# Patient Record
Sex: FEMALE | Race: WHITE | NOT HISPANIC OR LATINO | URBAN - METROPOLITAN AREA
[De-identification: names, ages, dates, MRNs, and addresses within clinical notes are randomized per-mention and may not be internally consistent; named-entity substitution may affect disease eponyms.]

---

## 2017-12-09 ENCOUNTER — EMERGENCY (EMERGENCY)
Facility: HOSPITAL | Age: 82
LOS: 1 days | Discharge: HOME | End: 2017-12-09
Attending: EMERGENCY MEDICINE
Payer: MEDICARE

## 2017-12-09 VITALS
OXYGEN SATURATION: 98 % | SYSTOLIC BLOOD PRESSURE: 145 MMHG | TEMPERATURE: 98 F | RESPIRATION RATE: 18 BRPM | HEART RATE: 69 BPM | DIASTOLIC BLOOD PRESSURE: 55 MMHG

## 2017-12-09 VITALS
HEART RATE: 70 BPM | OXYGEN SATURATION: 99 % | SYSTOLIC BLOOD PRESSURE: 147 MMHG | RESPIRATION RATE: 17 BRPM | DIASTOLIC BLOOD PRESSURE: 65 MMHG | TEMPERATURE: 98 F

## 2017-12-09 PROCEDURE — 99283 EMERGENCY DEPT VISIT LOW MDM: CPT | Mod: 25

## 2017-12-09 PROCEDURE — 99284 EMERGENCY DEPT VISIT MOD MDM: CPT | Mod: 25,GC

## 2017-12-09 PROCEDURE — 93005 ELECTROCARDIOGRAM TRACING: CPT

## 2017-12-09 PROCEDURE — 71010: CPT | Mod: 26

## 2017-12-09 PROCEDURE — 93010 ELECTROCARDIOGRAM REPORT: CPT

## 2017-12-09 PROCEDURE — 71045 X-RAY EXAM CHEST 1 VIEW: CPT

## 2017-12-09 RX ORDER — ONDANSETRON 8 MG/1
4 TABLET, FILM COATED ORAL ONCE
Qty: 0 | Refills: 0 | Status: COMPLETED | OUTPATIENT
Start: 2017-12-09 | End: 2017-12-09

## 2017-12-09 RX ORDER — FAMOTIDINE 10 MG/ML
20 INJECTION INTRAVENOUS ONCE
Qty: 0 | Refills: 0 | Status: DISCONTINUED | OUTPATIENT
Start: 2017-12-09 | End: 2017-12-09

## 2017-12-09 RX ORDER — FAMOTIDINE 10 MG/ML
20 INJECTION INTRAVENOUS ONCE
Qty: 0 | Refills: 0 | Status: COMPLETED | OUTPATIENT
Start: 2017-12-09 | End: 2017-12-09

## 2017-12-09 RX ORDER — SODIUM CHLORIDE 9 MG/ML
1000 INJECTION INTRAMUSCULAR; INTRAVENOUS; SUBCUTANEOUS ONCE
Qty: 0 | Refills: 0 | Status: COMPLETED | OUTPATIENT
Start: 2017-12-09 | End: 2017-12-09

## 2017-12-09 RX ORDER — GLUCAGON INJECTION, SOLUTION 0.5 MG/.1ML
1 INJECTION, SOLUTION SUBCUTANEOUS ONCE
Qty: 0 | Refills: 0 | Status: COMPLETED | OUTPATIENT
Start: 2017-12-09 | End: 2017-12-09

## 2017-12-09 RX ADMIN — Medication 30 MILLILITER(S): at 20:19

## 2017-12-09 RX ADMIN — FAMOTIDINE 20 MILLIGRAM(S): 10 INJECTION INTRAVENOUS at 20:19

## 2017-12-09 NOTE — ED ADULT NURSE REASSESSMENT NOTE - NS ED NURSE REASSESS COMMENT FT1
Pt given crackers and water. Able to tolerate well. Pt says she feels better and no symptoms like before.

## 2017-12-09 NOTE — ED PROVIDER NOTE - CONSTITUTIONAL, MLM
normal... Well appearing, well nourished, awake, alert, oriented to person, place, time/situation and in no apparent distress. Well appearing, well nourished, awake, alert, oriented to person, place, time/situation and in mild distress from discomfort

## 2017-12-09 NOTE — ED PROVIDER NOTE - OBJECTIVE STATEMENT
89 yo F with hx of pAF on coumadin, CHF and anemia requiring intermittent blood transfusions, now p/f home after "choking" on her dinner.  Pt states she was eating dinner when she felt like she chocked on a chestnut, developed epigastric discomfort, and vomited all contents. Pt's son who is a Radiologist, states they tried to give her sips of water to "push along the sensation of an obstruction" but she spit up the water as well.    Pt now has epigastric discomfort, no nausea. Last spit up was 30 min ago.  No other complaints. 89 yo F with hx of pAF on coumadin, CHF and anemia requiring intermittent blood transfusions, now p/f home after "choking" on her dinner. Pt states she was eating dinner when she felt like she chocked on a chestnut, developed epigastric discomfort, and vomited all contents. Pt's son who is a Radiologist, states they tried to give her sips of water to "push along the sensation of an obstruction" but she spit up the water as well.    Pt now has epigastric discomfort, no nausea. Last spit up was 30 min ago.  No other complaints.

## 2017-12-09 NOTE — ED PROVIDER NOTE - MEDICAL DECISION MAKING DETAILS
pepcid pt with likely food impaction, will attempt tp pass with water. if fails, glucagon, labs, imaging, GI cocktail.

## 2017-12-09 NOTE — ED ADULT NURSE REASSESSMENT NOTE - NS ED NURSE REASSESS COMMENT FT1
Pt says she feels like lump in throat has gone away. Pt nephew states he wanted to see is pt could tolerate PO before labs and IV meds are given. Pt given small cup of water. Pt tolerated well, no choking or gurgling noted. Pt says she does not feel how she felt before when she felt something lodged in her throat. MD Negrete aware. Was told to try oral maalox and pepcid.

## 2017-12-09 NOTE — ED PROVIDER NOTE - CHIEF COMPLAINT
The patient is a 89y Female complaining of epigastric pain The patient is a 89y Female complaining of food impaction

## 2017-12-09 NOTE — ED PROVIDER NOTE - CARE PLAN
Principal Discharge DX:	Choking due to food (regurgitated) Principal Discharge DX:	Food impaction of esophagus, initial encounter

## 2017-12-09 NOTE — ED ADULT NURSE NOTE - OBJECTIVE STATEMENT
89 y/o female PMH afib on coumadin, CHF presents to ED c/o esophageal discomfort s/p choking on jah at dinner about an hour before arrival. Pt states she was eating, felt herself choke and "coughed some of it out." She says she feels like something is still in her esophagus. Pt's son gave her water to try to wash it down, but pt was unable to keep fluids down. Last time she tried to take something orally was around 35 minutes ago and she was unable to swallow. Denies chest pain, SOB, trouble breathing. Lungs clear b/l. Breathing even, unlabored, O2 sat 99%. Gross motor and neuro intact. Pt safety and comfort provided. Family at bedside.

## 2017-12-09 NOTE — ED PROVIDER NOTE - ATTENDING CONTRIBUTION TO CARE
ATTENDING MD:  I, Roddy Negrete, personally have seen and examined this patient.  I have discussed all aspects of care with the resident physician. Resident note reviewed and agree on plan of care and except where noted.  See HPI, PE, and MDM for details.     well appearing female in NAD. AOx4. NCAT. pupils equal round and reactive to light, extra-occular movements are intact. mucus membranes are moist, oropharynx is within normal limits, tolerating secretions, no dysphonia. trachea midline, no stridor. no crepitus. s/p sternotomy, heart rate regular. no crepitance on auscultation or palpation of chest. abdomen soft,nontender, nondistended. no CVAT    MDM: pt with likely food impaction now symptomatically passed. tolerated full PO. will refer for close outpatient f/u. likely some mild erosive esophagitis but improved with GI cocktail. pt asymptomatic. stable. discussed risk and benefits of observation vs. close outpatient f/u with son at bedside who is MD. family elected for close outpatient f/u. stable for DC.

## 2017-12-09 NOTE — ED PROVIDER NOTE - PROGRESS NOTE DETAILS
Pt feels as though whatever was "stuck" has passed. Tolerated PO maalox and pepcid, 2 full cups of water. XRAY negative for perforation. Will DC home ATTENDING MD Caden: pt completely asymptomatic, has tolerated solids and liquids. No persistent obstruction. Do not think labs or imaging needed. Pt will be discharged to f/u with GI as outpatient. I have advised the patient on the usual course of this illness, an appropriate schedule for follow-up, and concerning signs and symptoms that should prompt return to the emergency department. I answered all questions to the best of my ability. The patient is stable for discharge. Pt's son, a radiologist was present and participated in this discussion. Pt feels as though whatever was "stuck" has passed. Tolerated PO maalox and pepcid, 2 full cups of water. XRAY negative for perforation. Will DC home if tolerates solids.

## 2017-12-10 NOTE — CONSULT NOTE ADULT - SUBJECTIVE AND OBJECTIVE BOX
Chief Complaint:  Patient is a 89y old  Female who presents with a chief complaint of     HPI:   89 yo F with hx of pAF on coumadin, CHF and anemia requiring intermittent blood transfusions, now p/f home after "choking" on her dinner.    As per patient, she was eating dinner and then felt that she choked on a "chestnut", developed epigastric discomfort and go no longer take PO (solids or liquids). Family attempted to give her sips of water to move the obstruction but she spit up the water as well.     In the ED, patients vitals were stable. No evidence of SOB or wheezing. She was able to handle her own secretions. She no longer had abdominal pain N/V.      Allergies:  No Known Allergies      Home Medications:    Hospital Medications:      PMHX/PSHX:  Paroxysmal a-fib  CHF (congestive heart failure)  No significant past surgical history      Family history:  No pertinent family history in first degree relatives      Social History:     ROS:     General:  No wt loss, fevers, chills, night sweats, fatigue,   Eyes:  Good vision, no reported pain  ENT:  No sore throat, pain, runny nose, dysphagia  CV:  No pain, palpitations, hypo/hypertension  Resp:  No dyspnea, cough, tachypnea, wheezing  GI:  See HPI  :  No pain, bleeding, incontinence, nocturia  Muscle:  No pain, weakness  Neuro:  No weakness, tingling, memory problems  Psych:  No fatigue, insomnia, mood problems, depression  Endocrine:  No polyuria, polydipsia, cold/heat intolerance  Heme:  No petechiae, ecchymosis, easy bruisability  Skin:  No rash, edema      PHYSICAL EXAM:     GENERAL:  Appears stated age, well-groomed, well-nourished, no distress  HEENT:  NC/AT,  conjunctivae clear and pink,  no JVD  CHEST:  Full & symmetric excursion, no increased effort, breath sounds clear  HEART:  Regular rhythm, S1, S2, no murmur/rub/S3/S4, no abdominal bruit, no edema  ABDOMEN:  Soft, non-tender, non-distended, normoactive bowel sounds,  no masses ,  EXTREMITIES:  no cyanosis,clubbing or edema  SKIN:  No rash/erythema/ecchymoses/petechiae/wounds/abscess/warm/dry  NEURO:  Alert, oriented    Vital Signs:  Vital Signs Last 24 Hrs  T(C): 36.7 (09 Dec 2017 19:27), Max: 36.7 (09 Dec 2017 19:09)  T(F): 98.1 (09 Dec 2017 19:27), Max: 98.1 (09 Dec 2017 19:09)  HR: 69 (09 Dec 2017 19:27) (69 - 70)  BP: 145/55 (09 Dec 2017 19:27) (145/55 - 147/65)  BP(mean): --  RR: 18 (09 Dec 2017 19:27) (17 - 18)  SpO2: 98% (09 Dec 2017 19:27) (98% - 99%)  Daily     Daily     LABS:                    Imaging:

## 2017-12-10 NOTE — CONSULT NOTE ADULT - ASSESSMENT
89 yo F with hx of pAF on coumadin, CHF and anemia requiring intermittent blood transfusions, now p/f home after "choking" on her dinner.    1)Food Impaction  2)AFib on coumadin  3)CHF  4)Anemia    -please continue to keep NPO  -patient is already on the wait list for endoscopy in the OR (approximately around noon or earlier)  -continue to monitor for worsening clinical status and inability to handler her own secretions

## 2017-12-28 ENCOUNTER — EMERGENCY (EMERGENCY)
Facility: HOSPITAL | Age: 82
LOS: 1 days | Discharge: HOME | End: 2017-12-28
Attending: EMERGENCY MEDICINE | Admitting: EMERGENCY MEDICINE
Payer: MEDICARE

## 2017-12-28 VITALS
SYSTOLIC BLOOD PRESSURE: 140 MMHG | OXYGEN SATURATION: 98 % | TEMPERATURE: 97 F | RESPIRATION RATE: 18 BRPM | HEART RATE: 71 BPM | DIASTOLIC BLOOD PRESSURE: 67 MMHG

## 2017-12-28 PROCEDURE — 99282 EMERGENCY DEPT VISIT SF MDM: CPT

## 2017-12-28 PROCEDURE — 99283 EMERGENCY DEPT VISIT LOW MDM: CPT | Mod: GC

## 2017-12-28 NOTE — ED ADULT NURSE NOTE - OBJECTIVE STATEMENT
89 y/o F ambulatory to ED c/o skin tear on L shin. Pt states she was climbing up the stairs on Tuesday and tripped. She takes Coumadin daily. Denies hitting head, LOC, cp, sob, pain at this time. AOX3, non-labored breathing, no bruises noted on upper extremities, no deformities noted, bleeding controlled with pressure applied at site of skin tear. MD at bedside cleaning wound and placing Steri-strips. VSS, will continue to reassess.

## 2017-12-28 NOTE — ED PROVIDER NOTE - NS ED ROS FT
ROS: no eye pain, no vision changes. no CP. no SOB/cough. no n/v/d/c, no abd pain. no rash. no urinary complaints. no weakness, no extremity swelling. no HA. no neck/back pain.

## 2017-12-28 NOTE — ED PROVIDER NOTE - PHYSICAL EXAMINATION
Gen: No acute distress, alert, cooperative  Head: Normocephalic, Atraumatic  HEENT: PERRL  Lung: CTAB, no respiratory distress, no crackles or wheezes  CV: rrr, no murmur  Abd: soft, NTND  MSK: 3 inch skin tear over left anterior shin. 1/4 inch diamter in some places. Wound fully explored, no deep area of injury, all superficial skin tear. Skin has rolled up underneath itself in some areas with dried blood. Wound with no active bleeding, clotting well. Pedal pulses bilaterally, good perfusion, skin warm and dry, good cap refill, normal ROm  Neuro: Grossly normal, sensation and strength intact JAIRO LE  Skin: See MSK  Psych: normal affect, follows commands
(0) independent

## 2017-12-28 NOTE — ED PROVIDER NOTE - OBJECTIVE STATEMENT
89yo female with hx of afib on coumadin presenting after a fall two days ago. She tripped on the second stair going up, fell, hit her left leg on the first stair. No other injuries, didn't hit her head, no LOC. Minimal pain, but the wound has kept bleeding so she came in today. 89yo female with hx of afib on coumadin presenting after a fall two days ago. She tripped on the second stair going up, fell, hit her left leg on the first stair. No other injuries, didn't hit her head, no LOC. Minimal pain, but the wound has kept bleeding so she came in today.    Attendinyo female presents with a skin tear to the left shin from 2 days ago.  No other injury.  Pt is on coumadin.

## 2017-12-28 NOTE — ED PROVIDER NOTE - MEDICAL DECISION MAKING DETAILS
89yo female with hx of afib on coumadin presenting after a fall two days ago with skin tear over left anterior shin 3 inches long. Wound fully explored, all superficial with dried blood and skin rolled under itself. Cleaned and rinsed with copious irrigation, skin unrolled and approximated as best as possible. steri strips placed to approximate skin tear as close as possible. No active bleeding after steri strip placement. Discharging with return precautions. 89yo female with hx of afib on coumadin presenting after a fall two days ago with skin tear over left anterior shin 3 inches long. Wound fully explored, all superficial with dried blood and skin rolled under itself. Cleaned and rinsed with copious irrigation, skin unrolled and approximated as best as possible. steri strips placed to approximate skin tear as close as possible. No active bleeding after steri strip placement. bacitracin and basic gauze bandage placed on top. Discharging. Patient agreeable with plan. Discussed wound care and return precautions with patient.

## 2018-01-09 ENCOUNTER — INPATIENT (INPATIENT)
Facility: HOSPITAL | Age: 83
LOS: 10 days | Discharge: HOME | DRG: 811 | End: 2018-01-20
Attending: INTERNAL MEDICINE | Admitting: INTERNAL MEDICINE
Payer: MEDICARE

## 2018-01-09 VITALS
SYSTOLIC BLOOD PRESSURE: 127 MMHG | RESPIRATION RATE: 22 BRPM | HEART RATE: 70 BPM | OXYGEN SATURATION: 99 % | DIASTOLIC BLOOD PRESSURE: 50 MMHG | TEMPERATURE: 98 F

## 2018-01-09 PROCEDURE — 93010 ELECTROCARDIOGRAM REPORT: CPT

## 2018-01-09 PROCEDURE — 99285 EMERGENCY DEPT VISIT HI MDM: CPT | Mod: 25,GC

## 2018-01-09 RX ORDER — SODIUM CHLORIDE 9 MG/ML
3 INJECTION INTRAMUSCULAR; INTRAVENOUS; SUBCUTANEOUS ONCE
Qty: 0 | Refills: 0 | Status: COMPLETED | OUTPATIENT
Start: 2018-01-09 | End: 2018-01-09

## 2018-01-09 NOTE — ED ADULT NURSE NOTE - OBJECTIVE STATEMENT
89 year old female A&OX3 PMHX of CHF, PSHX of Pacemaker placement, CABG, presents with shortness of breath x 1 week. Patient's family member states patient had a recent episode of anemia. Patient was seen outpatient at [facility] and was administered one unit of PRBC's. Patient reports feeling better after the transfusion but over the past week has developed increased shortness of breath. Lung sounds reveal rales in the lower right lobe. No swelling noted in the extremities. Patient denies nausea, vomiting, dizziness, weakness, fevers, chills, chest pain, palpitations.

## 2018-01-10 DIAGNOSIS — I50.32 CHRONIC DIASTOLIC (CONGESTIVE) HEART FAILURE: ICD-10-CM

## 2018-01-10 DIAGNOSIS — N17.9 ACUTE KIDNEY FAILURE, UNSPECIFIED: ICD-10-CM

## 2018-01-10 DIAGNOSIS — I50.9 HEART FAILURE, UNSPECIFIED: ICD-10-CM

## 2018-01-10 DIAGNOSIS — I48.0 PAROXYSMAL ATRIAL FIBRILLATION: ICD-10-CM

## 2018-01-10 DIAGNOSIS — R06.09 OTHER FORMS OF DYSPNEA: ICD-10-CM

## 2018-01-10 DIAGNOSIS — F03.90 UNSPECIFIED DEMENTIA WITHOUT BEHAVIORAL DISTURBANCE: ICD-10-CM

## 2018-01-10 DIAGNOSIS — Z29.9 ENCOUNTER FOR PROPHYLACTIC MEASURES, UNSPECIFIED: ICD-10-CM

## 2018-01-10 LAB
ALBUMIN SERPL ELPH-MCNC: 3.6 G/DL — SIGNIFICANT CHANGE UP (ref 3.3–5)
ALP SERPL-CCNC: 94 U/L — SIGNIFICANT CHANGE UP (ref 40–120)
ALT FLD-CCNC: 13 U/L RC — SIGNIFICANT CHANGE UP (ref 10–45)
ANION GAP SERPL CALC-SCNC: 12 MMOL/L — SIGNIFICANT CHANGE UP (ref 5–17)
ANION GAP SERPL CALC-SCNC: 14 MMOL/L — SIGNIFICANT CHANGE UP (ref 5–17)
APTT BLD: 50.2 SEC — HIGH (ref 27.5–37.4)
APTT BLD: 50.3 SEC — HIGH (ref 27.5–37.4)
AST SERPL-CCNC: 34 U/L — SIGNIFICANT CHANGE UP (ref 10–40)
BASE EXCESS BLDV CALC-SCNC: 0.1 MMOL/L — SIGNIFICANT CHANGE UP (ref -2–2)
BASOPHILS # BLD AUTO: 0 K/UL — SIGNIFICANT CHANGE UP (ref 0–0.2)
BASOPHILS NFR BLD AUTO: 0.5 % — SIGNIFICANT CHANGE UP (ref 0–2)
BILIRUB SERPL-MCNC: 0.6 MG/DL — SIGNIFICANT CHANGE UP (ref 0.2–1.2)
BLD GP AB SCN SERPL QL: NEGATIVE — SIGNIFICANT CHANGE UP
BUN SERPL-MCNC: 68 MG/DL — HIGH (ref 7–23)
BUN SERPL-MCNC: 74 MG/DL — HIGH (ref 7–23)
CA-I SERPL-SCNC: 1.16 MMOL/L — SIGNIFICANT CHANGE UP (ref 1.12–1.3)
CALCIUM SERPL-MCNC: 8.7 MG/DL — SIGNIFICANT CHANGE UP (ref 8.4–10.5)
CALCIUM SERPL-MCNC: 8.8 MG/DL — SIGNIFICANT CHANGE UP (ref 8.4–10.5)
CHLORIDE BLDV-SCNC: 102 MMOL/L — SIGNIFICANT CHANGE UP (ref 96–108)
CHLORIDE SERPL-SCNC: 100 MMOL/L — SIGNIFICANT CHANGE UP (ref 96–108)
CHLORIDE SERPL-SCNC: 100 MMOL/L — SIGNIFICANT CHANGE UP (ref 96–108)
CK MB CFR SERPL CALC: 2.5 NG/ML — SIGNIFICANT CHANGE UP (ref 0–3.8)
CK SERPL-CCNC: 71 U/L — SIGNIFICANT CHANGE UP (ref 25–170)
CO2 BLDV-SCNC: 27 MMOL/L — SIGNIFICANT CHANGE UP (ref 22–30)
CO2 SERPL-SCNC: 24 MMOL/L — SIGNIFICANT CHANGE UP (ref 22–31)
CO2 SERPL-SCNC: 27 MMOL/L — SIGNIFICANT CHANGE UP (ref 22–31)
CREAT SERPL-MCNC: 2.24 MG/DL — HIGH (ref 0.5–1.3)
CREAT SERPL-MCNC: 2.4 MG/DL — HIGH (ref 0.5–1.3)
DIGOXIN SERPL-MCNC: 2.5 NG/ML — HIGH (ref 0.8–2)
EOSINOPHIL # BLD AUTO: 0.1 K/UL — SIGNIFICANT CHANGE UP (ref 0–0.5)
EOSINOPHIL NFR BLD AUTO: 1.3 % — SIGNIFICANT CHANGE UP (ref 0–6)
GAS PNL BLDV: 138 MMOL/L — SIGNIFICANT CHANGE UP (ref 136–145)
GAS PNL BLDV: SIGNIFICANT CHANGE UP
GAS PNL BLDV: SIGNIFICANT CHANGE UP
GLUCOSE BLDV-MCNC: 114 MG/DL — HIGH (ref 70–99)
GLUCOSE SERPL-MCNC: 114 MG/DL — HIGH (ref 70–99)
GLUCOSE SERPL-MCNC: 118 MG/DL — HIGH (ref 70–99)
HCO3 BLDV-SCNC: 25 MMOL/L — SIGNIFICANT CHANGE UP (ref 21–29)
HCT VFR BLD CALC: 20.2 % — CRITICAL LOW (ref 34.5–45)
HCT VFR BLD CALC: 22.2 % — LOW (ref 34.5–45)
HCT VFR BLDA CALC: 20 % — CRITICAL LOW (ref 39–50)
HGB BLD CALC-MCNC: 6.5 G/DL — CRITICAL LOW (ref 11.5–15.5)
HGB BLD-MCNC: 6.8 G/DL — CRITICAL LOW (ref 11.5–15.5)
HGB BLD-MCNC: 7.1 G/DL — LOW (ref 11.5–15.5)
HOROWITZ INDEX BLDV+IHG-RTO: SIGNIFICANT CHANGE UP
INR BLD: 3.55 RATIO — HIGH (ref 0.88–1.16)
INR BLD: 3.67 RATIO — HIGH (ref 0.88–1.16)
LACTATE BLDV-MCNC: 2.3 MMOL/L — HIGH (ref 0.7–2)
LYMPHOCYTES # BLD AUTO: 0.5 K/UL — LOW (ref 1–3.3)
LYMPHOCYTES # BLD AUTO: 6.8 % — LOW (ref 13–44)
MCHC RBC-ENTMCNC: 31.8 PG — SIGNIFICANT CHANGE UP (ref 27–34)
MCHC RBC-ENTMCNC: 31.9 GM/DL — LOW (ref 32–36)
MCHC RBC-ENTMCNC: 33.7 PG — SIGNIFICANT CHANGE UP (ref 27–34)
MCHC RBC-ENTMCNC: 34 GM/DL — SIGNIFICANT CHANGE UP (ref 32–36)
MCV RBC AUTO: 99.1 FL — SIGNIFICANT CHANGE UP (ref 80–100)
MCV RBC AUTO: 99.8 FL — SIGNIFICANT CHANGE UP (ref 80–100)
MONOCYTES # BLD AUTO: 0.7 K/UL — SIGNIFICANT CHANGE UP (ref 0–0.9)
MONOCYTES NFR BLD AUTO: 9 % — SIGNIFICANT CHANGE UP (ref 2–14)
NEUTROPHILS # BLD AUTO: 6 K/UL — SIGNIFICANT CHANGE UP (ref 1.8–7.4)
NEUTROPHILS NFR BLD AUTO: 82.3 % — HIGH (ref 43–77)
PCO2 BLDV: 48 MMHG — SIGNIFICANT CHANGE UP (ref 35–50)
PH BLDV: 7.34 — LOW (ref 7.35–7.45)
PLATELET # BLD AUTO: 149 K/UL — LOW (ref 150–400)
PLATELET # BLD AUTO: 191 K/UL — SIGNIFICANT CHANGE UP (ref 150–400)
PO2 BLDV: 33 MMHG — SIGNIFICANT CHANGE UP (ref 25–45)
POTASSIUM BLDV-SCNC: 4.1 MMOL/L — SIGNIFICANT CHANGE UP (ref 3.5–5)
POTASSIUM SERPL-MCNC: 4.3 MMOL/L — SIGNIFICANT CHANGE UP (ref 3.5–5.3)
POTASSIUM SERPL-MCNC: 4.4 MMOL/L — SIGNIFICANT CHANGE UP (ref 3.5–5.3)
POTASSIUM SERPL-SCNC: 4.3 MMOL/L — SIGNIFICANT CHANGE UP (ref 3.5–5.3)
POTASSIUM SERPL-SCNC: 4.4 MMOL/L — SIGNIFICANT CHANGE UP (ref 3.5–5.3)
PROT SERPL-MCNC: 6.8 G/DL — SIGNIFICANT CHANGE UP (ref 6–8.3)
PROTHROM AB SERPL-ACNC: 39.4 SEC — HIGH (ref 9.8–12.7)
PROTHROM AB SERPL-ACNC: 41.1 SEC — HIGH (ref 9.8–12.7)
RAPID RVP RESULT: SIGNIFICANT CHANGE UP
RBC # BLD: 2.04 M/UL — LOW (ref 3.8–5.2)
RBC # BLD: 2.22 M/UL — LOW (ref 3.8–5.2)
RBC # FLD: 19.7 % — HIGH (ref 10.3–14.5)
RBC # FLD: 19.7 % — HIGH (ref 10.3–14.5)
RH IG SCN BLD-IMP: POSITIVE — SIGNIFICANT CHANGE UP
RH IG SCN BLD-IMP: POSITIVE — SIGNIFICANT CHANGE UP
SAO2 % BLDV: 51 % — LOW (ref 67–88)
SODIUM SERPL-SCNC: 138 MMOL/L — SIGNIFICANT CHANGE UP (ref 135–145)
SODIUM SERPL-SCNC: 139 MMOL/L — SIGNIFICANT CHANGE UP (ref 135–145)
TROPONIN T SERPL-MCNC: 0.03 NG/ML — SIGNIFICANT CHANGE UP (ref 0–0.06)
WBC # BLD: 5.6 K/UL — SIGNIFICANT CHANGE UP (ref 3.8–10.5)
WBC # BLD: 7.3 K/UL — SIGNIFICANT CHANGE UP (ref 3.8–10.5)
WBC # FLD AUTO: 5.6 K/UL — SIGNIFICANT CHANGE UP (ref 3.8–10.5)
WBC # FLD AUTO: 7.3 K/UL — SIGNIFICANT CHANGE UP (ref 3.8–10.5)

## 2018-01-10 PROCEDURE — 71045 X-RAY EXAM CHEST 1 VIEW: CPT | Mod: 26

## 2018-01-10 PROCEDURE — 99223 1ST HOSP IP/OBS HIGH 75: CPT

## 2018-01-10 RX ORDER — FOLIC ACID 0.8 MG
1 TABLET ORAL DAILY
Qty: 0 | Refills: 0 | Status: DISCONTINUED | OUTPATIENT
Start: 2018-01-10 | End: 2018-01-20

## 2018-01-10 RX ORDER — AMIODARONE HYDROCHLORIDE 400 MG/1
200 TABLET ORAL DAILY
Qty: 0 | Refills: 0 | Status: DISCONTINUED | OUTPATIENT
Start: 2018-01-10 | End: 2018-01-20

## 2018-01-10 RX ORDER — FOLIC ACID 0.8 MG
1 TABLET ORAL
Qty: 0 | Refills: 0 | COMMUNITY

## 2018-01-10 RX ORDER — DONEPEZIL HYDROCHLORIDE 10 MG/1
10 TABLET, FILM COATED ORAL AT BEDTIME
Qty: 0 | Refills: 0 | Status: DISCONTINUED | OUTPATIENT
Start: 2018-01-10 | End: 2018-01-20

## 2018-01-10 RX ORDER — DIGOXIN 250 MCG
0.12 TABLET ORAL DAILY
Qty: 0 | Refills: 0 | Status: DISCONTINUED | OUTPATIENT
Start: 2018-01-10 | End: 2018-01-10

## 2018-01-10 RX ORDER — FUROSEMIDE 40 MG
20 TABLET ORAL ONCE
Qty: 0 | Refills: 0 | Status: COMPLETED | OUTPATIENT
Start: 2018-01-10 | End: 2018-01-10

## 2018-01-10 RX ORDER — AMIODARONE HYDROCHLORIDE 400 MG/1
0 TABLET ORAL
Qty: 0 | Refills: 0 | COMMUNITY

## 2018-01-10 RX ORDER — CARVEDILOL PHOSPHATE 80 MG/1
3.12 CAPSULE, EXTENDED RELEASE ORAL EVERY 12 HOURS
Qty: 0 | Refills: 0 | Status: DISCONTINUED | OUTPATIENT
Start: 2018-01-10 | End: 2018-01-20

## 2018-01-10 RX ORDER — WARFARIN SODIUM 2.5 MG/1
0 TABLET ORAL
Qty: 0 | Refills: 0 | COMMUNITY

## 2018-01-10 RX ORDER — DIGOXIN 250 MCG
0 TABLET ORAL
Qty: 0 | Refills: 0 | COMMUNITY

## 2018-01-10 RX ORDER — FUROSEMIDE 40 MG
0 TABLET ORAL
Qty: 0 | Refills: 0 | COMMUNITY

## 2018-01-10 RX ORDER — FUROSEMIDE 40 MG
20 TABLET ORAL
Qty: 0 | Refills: 0 | COMMUNITY

## 2018-01-10 RX ORDER — CARVEDILOL PHOSPHATE 80 MG/1
0 CAPSULE, EXTENDED RELEASE ORAL
Qty: 0 | Refills: 0 | COMMUNITY

## 2018-01-10 RX ORDER — ACETAMINOPHEN 500 MG
650 TABLET ORAL ONCE
Qty: 0 | Refills: 0 | Status: COMPLETED | OUTPATIENT
Start: 2018-01-10 | End: 2018-01-10

## 2018-01-10 RX ORDER — WARFARIN SODIUM 2.5 MG/1
2.5 TABLET ORAL
Qty: 0 | Refills: 0 | COMMUNITY

## 2018-01-10 RX ORDER — WARFARIN SODIUM 2.5 MG/1
1 TABLET ORAL ONCE
Qty: 0 | Refills: 0 | Status: DISCONTINUED | OUTPATIENT
Start: 2018-01-10 | End: 2018-01-10

## 2018-01-10 RX ORDER — LOSARTAN POTASSIUM 100 MG/1
12.5 TABLET, FILM COATED ORAL DAILY
Qty: 0 | Refills: 0 | Status: DISCONTINUED | OUTPATIENT
Start: 2018-01-10 | End: 2018-01-10

## 2018-01-10 RX ORDER — CANDESARTAN CILEXETIL 8 MG/1
0 TABLET ORAL
Qty: 0 | Refills: 0 | COMMUNITY

## 2018-01-10 RX ORDER — DONEPEZIL HYDROCHLORIDE 10 MG/1
0 TABLET, FILM COATED ORAL
Qty: 0 | Refills: 0 | COMMUNITY

## 2018-01-10 RX ADMIN — CARVEDILOL PHOSPHATE 3.12 MILLIGRAM(S): 80 CAPSULE, EXTENDED RELEASE ORAL at 16:57

## 2018-01-10 RX ADMIN — Medication 650 MILLIGRAM(S): at 04:30

## 2018-01-10 RX ADMIN — Medication 1 MILLIGRAM(S): at 12:18

## 2018-01-10 RX ADMIN — DONEPEZIL HYDROCHLORIDE 10 MILLIGRAM(S): 10 TABLET, FILM COATED ORAL at 21:05

## 2018-01-10 RX ADMIN — Medication 20 MILLIGRAM(S): at 18:43

## 2018-01-10 RX ADMIN — Medication 20 MILLIGRAM(S): at 02:47

## 2018-01-10 RX ADMIN — AMIODARONE HYDROCHLORIDE 200 MILLIGRAM(S): 400 TABLET ORAL at 12:18

## 2018-01-10 RX ADMIN — SODIUM CHLORIDE 3 MILLILITER(S): 9 INJECTION INTRAMUSCULAR; INTRAVENOUS; SUBCUTANEOUS at 00:25

## 2018-01-10 RX ADMIN — Medication 650 MILLIGRAM(S): at 02:41

## 2018-01-10 NOTE — H&P ADULT - PROBLEM SELECTOR PLAN 3
possible pre renal  - will transfuse with diuresis  - will monitor u/o, and replace electrolytes  - will hold ARB and dig

## 2018-01-10 NOTE — ED PROVIDER NOTE - ATTENDING CONTRIBUTION TO CARE
I was physically present for the E/M service provided. I agree with above history, physical, and plan which I have reviewed and edited where appropriate. I was physically present for the key portions of the service provided.    89F PMH of Afib + CHF p/w SOB and LE swelling, no cough. Afebrile. Lungs CTA. CXR notable for small pleural effusion and cardiomegaly. BNP elevated. EKG non-ischemic + Troponin WNL x1. No PNA on CXR and no fever or cough. Will need admission for acute on chronic CHF exacerbation.

## 2018-01-10 NOTE — H&P ADULT - NSHPSOCIALHISTORY_GEN_ALL_CORE
lives single, lives with brother-in-law and nephew, retired , no smoking, but drinks on glass of red wine

## 2018-01-10 NOTE — H&P ADULT - ASSESSMENT
89 F Afib on coumadin, CHF 89 F Hx MVR (tissue/porcine) x 2 (1992, 1998), chronic CHF (unclear systolic/diastolic), Afib on coumadin, mild dementia p/w progressive ESCALERA a/w suspected acute decompensated CHF. 89 F Hx MVR (tissue/porcine) x 2 (1992, 1998), chronic CHF (unclear systolic/diastolic), Afib on coumadin, mild dementia p/w progressive ESCALERA a/w suspected acute decompensated CHF, possibly symptomatic anemia and SHANTEL.

## 2018-01-10 NOTE — ED PROVIDER NOTE - OBJECTIVE STATEMENT
89F hx of afib chf no ac presents with a cc of sob worsening x3 days, no cough congestion sore throat, notes worsening LE swelling over x1 day, no recent hospitalizations also c/o lightheadedness and dizziness. Generalized weakness, fatigue. No home o2. Denies n/v/f/c/cp. Denies headache, syncope, lightheadedness, dizziness. Denies chest palpitations, abdominal pain. Denies dysuria, hematuria, hematochezia, BRBPR, tarry stools, diarrhea, constipation.

## 2018-01-10 NOTE — ED ADULT NURSE REASSESSMENT NOTE - NS ED NURSE REASSESS COMMENT FT1
Pt reports some improvement in SOB since O2 admin. Denies CP. CXR at bedside. Family at bedside aware of plan of care Received report from covering RN. Pt reports some improvement in SOB since O2 admin. Denies CP. CXR at bedside. Family at bedside aware of plan of care. Pt reports fall yesterday morning, and reporting tenderness to R flank. MD Garcia aware and to order tylenol. Wound present to RLE from previous fall about 2 weeks ago. Wound dressed and bacitracin applied.

## 2018-01-10 NOTE — ED PROVIDER NOTE - MEDICAL DECISION MAKING DETAILS
89F hx of afib chf no ac presents with a cc of sob worsening x3 days, no cough congestion sore throat, notes worsening LE swelling over x1 day, no recent hospitalizations also c/o lightheadedness and dizziness. No home o2, exam vss breathing comfortably on 2L, crackles RLL otherwise exam non focal, c/f pns vs chf exacerbation vs viral syndrome will get labs cardiacs cxr reassess

## 2018-01-10 NOTE — CONSULT NOTE ADULT - SUBJECTIVE AND OBJECTIVE BOX
Chief Complaint:     HPI:    PMH:   Paroxysmal a-fib  CHF (congestive heart failure)    PSH:   No significant past surgical history    Family History:  FAMILY HISTORY:  No pertinent family history in first degree relatives    Allergies:  No Known Allergies    Social History:  Smoking:  Alcohol:  Drugs:    Medications:  amiodarone    Tablet 200 milliGRAM(s) Oral daily  carvedilol 3.125 milliGRAM(s) Oral every 12 hours  donepezil 10 milliGRAM(s) Oral at bedtime  folic acid 1 milliGRAM(s) Oral daily  metolazone 2.5 milliGRAM(s) Oral daily  warfarin 1 milliGRAM(s) Oral once      Cardiovascular Diagnostic Testing:  ECG:    Echo:    Stress Testing:    Cath:    Imaging:    Labs:                        6.8    5.6   )-----------( 149      ( 10 Colt 2018 09:13 )             20.2     01-10    139  |  100  |  74<H>  ----------------------------<  118<H>  4.4   |  27  |  2.40<H>    Ca    8.7      10 Colt 2018 09:13    TPro  6.8  /  Alb  3.6  /  TBili  0.6  /  DBili  x   /  AST  34  /  ALT  13  /  AlkPhos  94  01-10    PT/INR - ( 10 Colt 2018 09:13 )   PT: 41.1 sec;   INR: 3.67 ratio         PTT - ( 10 Colt 2018 09:13 )  PTT:50.3 sec  CARDIAC MARKERS ( 10 Colt 2018 00:37 )  x     / 0.03 ng/mL / 71 U/L / x     / 2.5 ng/mL      Serum Pro-Brain Natriuretic Peptide: 42275 pg/mL (01-10 @ 00:37)            Physical Exam:  T(C): 36.3 (01-10-18 @ 04:28), Max: 36.7 (01-09-18 @ 23:13)  HR: 70 (01-10-18 @ 04:28) (63 - 73)  BP: 136/70 (01-10-18 @ 04:28) (127/38 - 156/52)  RR: 18 (01-10-18 @ 04:28) (16 - 22)  SpO2: 100% (01-10-18 @ 04:28) (97% - 100%)  Wt(kg): --    Daily Height in cm: 162.56 (10 Colt 2018 04:28)    Daily Chief Complaint: SOB    HPI: 89 F with PMH as stated present with SOB. Patient has had chronic dyspnea but seems to progress lately. Patient is poor historian and does not offer specifics. 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 She has baseline Hgb of 7-8 and gets transfused when it drifts down into the 6 range. She also has AF rate controlled and on chronic A/C. She also has chronic HF EF at this time not clear. Will confirm information with regular cardiologist Dr. Garcia. Patient also noted to have elevated crt (SHANTEL) which is new.     PMH:   Paroxysmal a-fib  CHF (congestive heart failure)    PSH:   Mitral valve X2.     Family History:  FAMILY HISTORY:  No pertinent family history in first degree relatives    Allergies:  No Known Allergies    Social History:  Smoking:  Alcohol:  Drugs:    Medications:  amiodarone    Tablet 200 milliGRAM(s) Oral daily  carvedilol 3.125 milliGRAM(s) Oral every 12 hours  donepezil 10 milliGRAM(s) Oral at bedtime  folic acid 1 milliGRAM(s) Oral daily  metolazone 2.5 milliGRAM(s) Oral daily  warfarin 1 milliGRAM(s) Oral once      Cardiovascular Diagnostic Testing:  ECG: Sinus with BiV-pace.     Echo:     Stress Testing:    Cath:    Imaging: < from: Xray Chest 1 View AP/PA (01.10.18 @ 00:37) >  Cardiomegaly with small right pleural effusion.    < end of copied text >      Labs:                        6.8    5.6   )-----------( 149      ( 10 Colt 2018 09:13 )             20.2     01-10    139  |  100  |  74<H>  ----------------------------<  118<H>  4.4   |  27  |  2.40<H>    Ca    8.7      10 Colt 2018 09:13    TPro  6.8  /  Alb  3.6  /  TBili  0.6  /  DBili  x   /  AST  34  /  ALT  13  /  AlkPhos  94  01-10    PT/INR - ( 10 Colt 2018 09:13 )   PT: 41.1 sec;   INR: 3.67 ratio         PTT - ( 10 Colt 2018 09:13 )  PTT:50.3 sec  CARDIAC MARKERS ( 10 Colt 2018 00:37 )  x     / 0.03 ng/mL / 71 U/L / x     / 2.5 ng/mL      Serum Pro-Brain Natriuretic Peptide: 74442 pg/mL (01-10 @ 00:37)            Physical Exam:  T(C): 36.3 (01-10-18 @ 04:28), Max: 36.7 (01-09-18 @ 23:13)  HR: 70 (01-10-18 @ 04:28) (63 - 73)  BP: 136/70 (01-10-18 @ 04:28) (127/38 - 156/52)  RR: 18 (01-10-18 @ 04:28) (16 - 22)  SpO2: 100% (01-10-18 @ 04:28) (97% - 100%)  Wt(kg): --    Daily Height in cm: 162.56 (10 Colt 2018 04:28)    Daily

## 2018-01-10 NOTE — H&P ADULT - PROBLEM SELECTOR PLAN 1
DDx acute decomp CHF, symptomatic anemia, SHANTEL  - s/p Lasix 20 iv w/o significant diuresis (u/o 300-400cc) and w/o sig symptom improvement  - Considering SHANTEL with anemia Hb 7, will not further diurese.  Case discussed with Cardio, Dr. Garcia.  Will transfuse 1 u pRBC with diuresis to improve SHANTEL and treat symptomatic anemia.  - Cardio eval with Dr. Tad Salamanca  - tele monitor for arrhythmia and serial cardiac enzymes  - will check TTE to eval structural heart dz and LV EF%

## 2018-01-10 NOTE — H&P ADULT - HISTORY OF PRESENT ILLNESS
89 F Hx Afib on coumadin, dementia, CHF 89 F Hx MVR (tissue/porcine) x 2 (1992, 1998), chronic CHF (unclear systolic/diastolic), Afib on coumadin, mild dementia p/w progressive ESCALERA for the past week, no leg swelling, n weight gain, no CP/syncope,  Patient scraped her L shin 3-4 wks ago, evaluated in [facility].  No sick contact, compliant with meds and diet. 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  No sick contact, compliant with meds and diet.

## 2018-01-10 NOTE — H&P ADULT - PROBLEM SELECTOR PLAN 2
- will hold dig due to elevated level 2.5, especially being on Amio  - will treat with Amio and Coreg  - INR 3.5, will dose low, couamdin 1 mg tonight. - will hold dig due to elevated level 2.5, especially being on Amio  - will treat with Amio and Coreg  - INR 3.5, will hold if INR trending up

## 2018-01-11 DIAGNOSIS — D64.9 ANEMIA, UNSPECIFIED: ICD-10-CM

## 2018-01-11 LAB
ANION GAP SERPL CALC-SCNC: 15 MMOL/L — SIGNIFICANT CHANGE UP (ref 5–17)
BASOPHILS # BLD AUTO: 0.04 K/UL — SIGNIFICANT CHANGE UP (ref 0–0.2)
BASOPHILS NFR BLD AUTO: 0.9 % — SIGNIFICANT CHANGE UP (ref 0–2)
BUN SERPL-MCNC: 65 MG/DL — HIGH (ref 7–23)
CALCIUM SERPL-MCNC: 8.3 MG/DL — LOW (ref 8.4–10.5)
CHLORIDE SERPL-SCNC: 100 MMOL/L — SIGNIFICANT CHANGE UP (ref 96–108)
CO2 SERPL-SCNC: 26 MMOL/L — SIGNIFICANT CHANGE UP (ref 22–31)
CREAT SERPL-MCNC: 2.2 MG/DL — HIGH (ref 0.5–1.3)
EOSINOPHIL # BLD AUTO: 0.13 K/UL — SIGNIFICANT CHANGE UP (ref 0–0.5)
EOSINOPHIL NFR BLD AUTO: 2.8 % — SIGNIFICANT CHANGE UP (ref 0–6)
GLUCOSE SERPL-MCNC: 109 MG/DL — HIGH (ref 70–99)
HCT VFR BLD CALC: 21.7 % — LOW (ref 34.5–45)
HCT VFR BLD CALC: 22.2 % — LOW (ref 34.5–45)
HGB BLD-MCNC: 6.8 G/DL — CRITICAL LOW (ref 11.5–15.5)
HGB BLD-MCNC: 7.3 G/DL — LOW (ref 11.5–15.5)
IMM GRANULOCYTES NFR BLD AUTO: 0.4 % — SIGNIFICANT CHANGE UP (ref 0–1.5)
INR BLD: 2.41 RATIO — HIGH (ref 0.88–1.16)
LYMPHOCYTES # BLD AUTO: 0.5 K/UL — LOW (ref 1–3.3)
LYMPHOCYTES # BLD AUTO: 10.6 % — LOW (ref 13–44)
MANUAL SMEAR VERIFICATION: SIGNIFICANT CHANGE UP
MCHC RBC-ENTMCNC: 29.7 PG — SIGNIFICANT CHANGE UP (ref 27–34)
MCHC RBC-ENTMCNC: 30.6 GM/DL — LOW (ref 32–36)
MCHC RBC-ENTMCNC: 32.9 PG — SIGNIFICANT CHANGE UP (ref 27–34)
MCHC RBC-ENTMCNC: 33.6 GM/DL — SIGNIFICANT CHANGE UP (ref 32–36)
MCV RBC AUTO: 96.9 FL — SIGNIFICANT CHANGE UP (ref 80–100)
MCV RBC AUTO: 98.2 FL — SIGNIFICANT CHANGE UP (ref 80–100)
MONOCYTES # BLD AUTO: 0.55 K/UL — SIGNIFICANT CHANGE UP (ref 0–0.9)
MONOCYTES NFR BLD AUTO: 11.7 % — SIGNIFICANT CHANGE UP (ref 2–14)
NEUTROPHILS # BLD AUTO: 3.46 K/UL — SIGNIFICANT CHANGE UP (ref 1.8–7.4)
NEUTROPHILS NFR BLD AUTO: 73.6 % — SIGNIFICANT CHANGE UP (ref 43–77)
PLAT MORPH BLD: NORMAL — SIGNIFICANT CHANGE UP
PLATELET # BLD AUTO: 127 K/UL — LOW (ref 150–400)
PLATELET # BLD AUTO: 154 K/UL — SIGNIFICANT CHANGE UP (ref 150–400)
POTASSIUM SERPL-MCNC: 4.2 MMOL/L — SIGNIFICANT CHANGE UP (ref 3.5–5.3)
POTASSIUM SERPL-SCNC: 4.2 MMOL/L — SIGNIFICANT CHANGE UP (ref 3.5–5.3)
PROTHROM AB SERPL-ACNC: 27.7 SEC — HIGH (ref 10–13.1)
RBC # BLD: 2.21 M/UL — LOW (ref 3.8–5.2)
RBC # BLD: 2.29 M/UL — LOW (ref 3.8–5.2)
RBC # FLD: 19.5 % — HIGH (ref 10.3–14.5)
RBC # FLD: 22.5 % — HIGH (ref 10.3–14.5)
RBC BLD AUTO: SIGNIFICANT CHANGE UP
SODIUM SERPL-SCNC: 141 MMOL/L — SIGNIFICANT CHANGE UP (ref 135–145)
WBC # BLD: 4.7 K/UL — SIGNIFICANT CHANGE UP (ref 3.8–10.5)
WBC # BLD: 6.4 K/UL — SIGNIFICANT CHANGE UP (ref 3.8–10.5)
WBC # FLD AUTO: 4.7 K/UL — SIGNIFICANT CHANGE UP (ref 3.8–10.5)
WBC # FLD AUTO: 6.4 K/UL — SIGNIFICANT CHANGE UP (ref 3.8–10.5)

## 2018-01-11 PROCEDURE — 99232 SBSQ HOSP IP/OBS MODERATE 35: CPT

## 2018-01-11 RX ORDER — ACETAMINOPHEN 500 MG
650 TABLET ORAL ONCE
Qty: 0 | Refills: 0 | Status: DISCONTINUED | OUTPATIENT
Start: 2018-01-11 | End: 2018-01-11

## 2018-01-11 RX ORDER — FUROSEMIDE 40 MG
20 TABLET ORAL ONCE
Qty: 0 | Refills: 0 | Status: COMPLETED | OUTPATIENT
Start: 2018-01-11 | End: 2018-01-11

## 2018-01-11 RX ORDER — ACETAMINOPHEN 500 MG
650 TABLET ORAL ONCE
Qty: 0 | Refills: 0 | Status: COMPLETED | OUTPATIENT
Start: 2018-01-11 | End: 2018-01-13

## 2018-01-11 RX ADMIN — AMIODARONE HYDROCHLORIDE 200 MILLIGRAM(S): 400 TABLET ORAL at 05:17

## 2018-01-11 RX ADMIN — Medication 1 MILLIGRAM(S): at 11:11

## 2018-01-11 RX ADMIN — Medication 20 MILLIGRAM(S): at 16:55

## 2018-01-11 RX ADMIN — CARVEDILOL PHOSPHATE 3.12 MILLIGRAM(S): 80 CAPSULE, EXTENDED RELEASE ORAL at 17:01

## 2018-01-11 RX ADMIN — DONEPEZIL HYDROCHLORIDE 10 MILLIGRAM(S): 10 TABLET, FILM COATED ORAL at 21:23

## 2018-01-11 RX ADMIN — CARVEDILOL PHOSPHATE 3.12 MILLIGRAM(S): 80 CAPSULE, EXTENDED RELEASE ORAL at 05:17

## 2018-01-11 NOTE — PROGRESS NOTE ADULT - SUBJECTIVE AND OBJECTIVE BOX
Patient is a 89y old  Female who presents with a chief complaint of SOB (10 Colt 2018 07:35)      SUBJECTIVE / OVERNIGHT EVENTS:   Feels better.  Denies CP/SOB/Palpitation/HA.    MEDICATIONS  (STANDING):  amiodarone    Tablet 200 milliGRAM(s) Oral daily  carvedilol 3.125 milliGRAM(s) Oral every 12 hours  donepezil 10 milliGRAM(s) Oral at bedtime  folic acid 1 milliGRAM(s) Oral daily    MEDICATIONS  (PRN):  acetaminophen   Tablet. 650 milliGRAM(s) Oral once PRN Moderate Pain (4 - 6)        CAPILLARY BLOOD GLUCOSE        I&O's Summary    10 Colt 2018 07:01  -  11 Jan 2018 07:00  --------------------------------------------------------  IN: 600 mL / OUT: 0 mL / NET: 600 mL    11 Jan 2018 07:01  -  11 Jan 2018 22:45  --------------------------------------------------------  IN: 840 mL / OUT: 800 mL / NET: 40 mL        PHYSICAL EXAM:  GENERAL: NAD, well-developed  HEAD:  Atraumatic, Normocephalic  NECK: Supple, No JVD  CHEST/LUNG: Clear to auscultation bilaterally; No wheezing.  HEART: Regular rate and rhythm; No murmurs, rubs, or gallops  ABDOMEN: Soft, Nontender, Nondistended; Bowel sounds present  EXTREMITIES:   No clubbing, cyanosis, or edema  NEUROLOGY: AAO X 3  SKIN: No rashes    LABS:                        6.8    4.70  )-----------( 154      ( 11 Jan 2018 07:39 )             22.2     01-11    141  |  100  |  65<H>  ----------------------------<  109<H>  4.2   |  26  |  2.20<H>    Ca    8.3<L>      11 Jan 2018 08:09    TPro  6.8  /  Alb  3.6  /  TBili  0.6  /  DBili  x   /  AST  34  /  ALT  13  /  AlkPhos  94  01-10    PT/INR - ( 11 Jan 2018 07:39 )   PT: 27.7 sec;   INR: 2.41 ratio         PTT - ( 10 Colt 2018 09:13 )  PTT:50.3 sec  CARDIAC MARKERS ( 10 Colt 2018 00:37 )  x     / 0.03 ng/mL / 71 U/L / x     / 2.5 ng/mL        CAPILLARY BLOOD GLUCOSE                    RADIOLOGY & ADDITIONAL TESTS:    Imaging Personally Reviewed:    Consultant(s) Notes Reviewed:      Care Discussed with Consultants/Other Providers:

## 2018-01-11 NOTE — CONSULT NOTE ADULT - PROBLEM SELECTOR RECOMMENDATION 9
patient with likely multifactorial cause of anemia  doubt ongoing blood loss  stool is brown  follow up occult  hemolysis workup  may need hematology eval  diet as tolerated  will follow patient with likely multifactorial cause of anemia including hemolysis and gi blood loss  case d/w jessica moreland ([facility]) [phone]  will plan for colonoscopy next week once INR less than 1.5   diet as tolerated

## 2018-01-11 NOTE — PROGRESS NOTE ADULT - ASSESSMENT
89 F Hx MVR (tissue/porcine) x 2 (1992, 1998), chronic CHF (unclear systolic/diastolic), Afib on coumadin, mild dementia p/w progressive ESCALERA a/w suspected acute decompensated CHF, possibly symptomatic anemia and SHANTEL.

## 2018-01-11 NOTE — PROGRESS NOTE ADULT - ASSESSMENT
89 F with likely multifactorial dyspnea with MVR, HF unknonw EF  ·	Suspect multiple factors contribute to patient's symptoms including anemia and some component of HF  ·	Will need echo to confirm EF and prosthesis function.   ·	Agree with PRBC as less than expected response to prior unit.   ·	Given SHANTEL would hold diuretics except after PRBC.   ·	Patient has been on amio. No evidence on exam or chest X-ray for amio pulmonary toxicity but it should be considered.   ·	Renal eval for SHANTEL. 89 F with likely multifactorial dyspnea with MVR, HF unknonw EF  ·	Patient did not have increase in Hgb after PRBC but hgb trended down again.   ·	Follow up echo.   ·	Hold diuretics except with PRBC. Strict I and O.   ·	Continue other cardiac medications for now.   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

## 2018-01-11 NOTE — CHART NOTE - NSCHARTNOTEFT_GEN_A_CORE
MODE[place] Castillo  [id number]  consulted w/ MD Anna for pt w/ low h/h 6.8/22.2, stated to transfused a total of 1 unit PRBC ( 1/2 unit , lasix 20mg iv in between and another 1/2 unit). RN aware

## 2018-01-11 NOTE — PROGRESS NOTE ADULT - SUBJECTIVE AND OBJECTIVE BOX
CC:SOB, anemia, MV disease, PAF    Interval History: Hgb still 6.8 after PRBC.     MEDICATIONS:  acetaminophen   Tablet. 650 milliGRAM(s) Oral once PRN  amiodarone    Tablet 200 milliGRAM(s) Oral daily  carvedilol 3.125 milliGRAM(s) Oral every 12 hours  donepezil 10 milliGRAM(s) Oral at bedtime  folic acid 1 milliGRAM(s) Oral daily  furosemide   Injectable 20 milliGRAM(s) IV Push once  metolazone 2.5 milliGRAM(s) Oral daily      LABS:  01-11    141  |  100  |  65<H>  ----------------------------<  109<H>  4.2   |  26  |  2.20<H>    Ca    8.3<L>      11 Jan 2018 08:09    TPro  6.8  /  Alb  3.6  /  TBili  0.6  /  DBili  x   /  AST  34  /  ALT  13  /  AlkPhos  94  01-10                          6.8    4.70  )-----------( 154      ( 11 Jan 2018 07:39 )             22.2     PT/INR - ( 11 Jan 2018 07:39 )   PT: 27.7 sec;   INR: 2.41 ratio         PTT - ( 10 Colt 2018 09:13 )  PTT:50.3 sec  CARDIAC MARKERS ( 10 Colt 2018 00:37 )  x     / 0.03 ng/mL / 71 U/L / x     / 2.5 ng/mL          VITAL SIGNS:   T(C): 36.7 (01-11-18 @ 13:20), Max: 36.8 (01-11-18 @ 04:29)  HR: 70 (01-11-18 @ 13:20) (70 - 74)  BP: 126/72 (01-11-18 @ 13:20) (117/67 - 144/76)  RR: 18 (01-11-18 @ 13:20) (18 - 18)  SpO2: 96% (01-11-18 @ 13:20) (94% - 100%)  Daily     Daily   I&O's Summary    10 Colt 2018 07:01  -  11 Jan 2018 07:00  --------------------------------------------------------  IN: 600 mL / OUT: 0 mL / NET: 600 mL    11 Jan 2018 07:01  -  11 Jan 2018 16:18  --------------------------------------------------------  IN: 720 mL / OUT: 300 mL / NET: 420 mL        TELE: No significant ectopy

## 2018-01-11 NOTE — CONSULT NOTE ADULT - SUBJECTIVE AND OBJECTIVE BOX
[facility]  Haseeb Obrien PA-C   [address] [place] [address] [phone]      HPI: 89 F with PMH as stated present with SOB. Patient has had chronic dyspnea but seems to progress lately. Patient is poor historian and does not offer specifics. 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 She has baseline Hgb of 7-8 and gets transfused when it drifts down into the 6 range. She also has AF rate controlled and on chronic A/C. She also has chronic HF EF at this time not clear. Patient also noted to have elevated crt (SHANTEL) which is new.     PMH:   Paroxysmal a-fib  CHF (congestive heart failure)    PSH:   Mitral valve X2.     Family History:  FAMILY HISTORY:  No pertinent family history in first degree relatives    Allergies:  No Known Allergies    Social History:  Smoking:  Alcohol:  Drugs:    Medications:  amiodarone    Tablet 200 milliGRAM(s) Oral daily  carvedilol 3.125 milliGRAM(s) Oral every 12 hours  donepezil 10 milliGRAM(s) Oral at bedtime  folic acid 1 milliGRAM(s) Oral daily  metolazone 2.5 milliGRAM(s) Oral daily  warfarin 1 milliGRAM(s) Oral once      Cardiovascular Diagnostic Testing:  ECG: Sinus with BiV-pace.     Echo:     Stress Testing:    Cath:    Imaging: < from: Xray Chest 1 View AP/PA (01.10.18 @ 00:37) >  Cardiomegaly with small right pleural effusion.    < end of copied text >      Labs:                        6.8    5.6   )-----------( 149      ( 10 Colt 2018 09:13 )             20.2     01-10    139  |  100  |  74<H>  ----------------------------<  118<H>  4.4   |  27  |  2.40<H>    Ca    8.7      10 Colt 2018 09:13    TPro  6.8  /  Alb  3.6  /  TBili  0.6  /  DBili  x   /  AST  34  /  ALT  13  /  AlkPhos  94  01-10    PT/INR - ( 10 Colt 2018 09:13 )   PT: 41.1 sec;   INR: 3.67 ratio         PTT - ( 10 Colt 2018 09:13 )  PTT:50.3 sec  CARDIAC MARKERS ( 10 Colt 2018 00:37 )  x     / 0.03 ng/mL / 71 U/L / x     / 2.5 ng/mL      Serum Pro-Brain Natriuretic Peptide: 13259 pg/mL (01-10 @ 00:37)            Physical Exam:  T(C): 36.3 (01-10-18 @ 04:28), Max: 36.7 (01-09-18 @ 23:13)  HR: 70 (01-10-18 @ 04:28) (63 - 73)  BP: 136/70 (01-10-18 @ 04:28) (127/38 - 156/52)  RR: 18 (01-10-18 @ 04:28) (16 - 22)  SpO2: 100% (01-10-18 @ 04:28) (97% - 100%)  Wt(kg): --    Daily Height in cm: 162.56 (10 Colt 2018 04:28)    Daily [facility]  Haseeb Obrien PA-C   [address] [place] [address] [phone]      HPI: 89 F with PMH as stated present with SOB. Patient has had chronic dyspnea but seems to progress lately. Patient is poor historian and does not offer specifics. 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 She has baseline Hgb of 7-8 and gets transfused when it drifts down into the 6 range. She also has AF rate controlled and on chronic A/C. She also has chronic HF EF at this time not clear. Patient also noted to have elevated crt (SHANTEL) which is new. She has angioectasia of the colon and has bleeding from them    PMH:   Paroxysmal a-fib  CHF (congestive heart failure)    PSH:   Mitral valve X2. (mechanical)    Family History:  FAMILY HISTORY:  No pertinent family history in first degree relatives    Allergies:  No Known Allergies    Social History:  Smoking:  Alcohol:  Drugs:    Medications:  amiodarone    Tablet 200 milliGRAM(s) Oral daily  carvedilol 3.125 milliGRAM(s) Oral every 12 hours  donepezil 10 milliGRAM(s) Oral at bedtime  folic acid 1 milliGRAM(s) Oral daily  metolazone 2.5 milliGRAM(s) Oral daily  warfarin 1 milliGRAM(s) Oral once      Cardiovascular Diagnostic Testing:  ECG: Sinus with BiV-pace.     Echo:     Stress Testing:    Cath:    Imaging: < from: Xray Chest 1 View AP/PA (01.10.18 @ 00:37) >  Cardiomegaly with small right pleural effusion.    < end of copied text >      Labs:                        6.8    5.6   )-----------( 149      ( 10 Colt 2018 09:13 )             20.2     01-10    139  |  100  |  74<H>  ----------------------------<  118<H>  4.4   |  27  |  2.40<H>    Ca    8.7      10 Colt 2018 09:13    TPro  6.8  /  Alb  3.6  /  TBili  0.6  /  DBili  x   /  AST  34  /  ALT  13  /  AlkPhos  94  01-10    PT/INR - ( 10 Colt 2018 09:13 )   PT: 41.1 sec;   INR: 3.67 ratio         PTT - ( 10 Colt 2018 09:13 )  PTT:50.3 sec  CARDIAC MARKERS ( 10 Colt 2018 00:37 )  x     / 0.03 ng/mL / 71 U/L / x     / 2.5 ng/mL      Serum Pro-Brain Natriuretic Peptide: 58662 pg/mL (01-10 @ 00:37)            Physical Exam:  T(C): 36.3 (01-10-18 @ 04:28), Max: 36.7 (01-09-18 @ 23:13)  HR: 70 (01-10-18 @ 04:28) (63 - 73)  BP: 136/70 (01-10-18 @ 04:28) (127/38 - 156/52)  RR: 18 (01-10-18 @ 04:28) (16 - 22)  SpO2: 100% (01-10-18 @ 04:28) (97% - 100%)  Wt(kg): --    Daily Height in cm: 162.56 (10 Colt 2018 04:28)    Daily

## 2018-01-12 DIAGNOSIS — T46.0X1A POISONING BY CARDIAC-STIMULANT GLYCOSIDES AND DRUGS OF SIMILAR ACTION, ACCIDENTAL (UNINTENTIONAL), INITIAL ENCOUNTER: ICD-10-CM

## 2018-01-12 LAB
ANION GAP SERPL CALC-SCNC: 11 MMOL/L — SIGNIFICANT CHANGE UP (ref 5–17)
ANION GAP SERPL CALC-SCNC: 15 MMOL/L — SIGNIFICANT CHANGE UP (ref 5–17)
APPEARANCE UR: CLEAR — SIGNIFICANT CHANGE UP
APTT BLD: 40.5 SEC — HIGH (ref 27.5–37.4)
BASOPHILS # BLD AUTO: 0.04 K/UL — SIGNIFICANT CHANGE UP (ref 0–0.2)
BASOPHILS NFR BLD AUTO: 0.9 % — SIGNIFICANT CHANGE UP (ref 0–2)
BILIRUB UR-MCNC: NEGATIVE — SIGNIFICANT CHANGE UP
BUN SERPL-MCNC: 63 MG/DL — HIGH (ref 7–23)
BUN SERPL-MCNC: 65 MG/DL — HIGH (ref 7–23)
CALCIUM SERPL-MCNC: 8.7 MG/DL — SIGNIFICANT CHANGE UP (ref 8.4–10.5)
CALCIUM SERPL-MCNC: 8.7 MG/DL — SIGNIFICANT CHANGE UP (ref 8.4–10.5)
CHLORIDE SERPL-SCNC: 98 MMOL/L — SIGNIFICANT CHANGE UP (ref 96–108)
CHLORIDE SERPL-SCNC: 99 MMOL/L — SIGNIFICANT CHANGE UP (ref 96–108)
CHLORIDE UR-SCNC: <20 MMOL/L — SIGNIFICANT CHANGE UP
CO2 SERPL-SCNC: 25 MMOL/L — SIGNIFICANT CHANGE UP (ref 22–31)
CO2 SERPL-SCNC: 28 MMOL/L — SIGNIFICANT CHANGE UP (ref 22–31)
COLOR SPEC: YELLOW — SIGNIFICANT CHANGE UP
CREAT ?TM UR-MCNC: 71 MG/DL — SIGNIFICANT CHANGE UP
CREAT SERPL-MCNC: 1.8 MG/DL — HIGH (ref 0.5–1.3)
CREAT SERPL-MCNC: 2.04 MG/DL — HIGH (ref 0.5–1.3)
DIFF PNL FLD: NEGATIVE — SIGNIFICANT CHANGE UP
DIGOXIN SERPL-MCNC: 1.6 NG/ML — SIGNIFICANT CHANGE UP (ref 0.8–2)
EOSINOPHIL # BLD AUTO: 0.09 K/UL — SIGNIFICANT CHANGE UP (ref 0–0.5)
EOSINOPHIL NFR BLD AUTO: 2 % — SIGNIFICANT CHANGE UP (ref 0–6)
GLUCOSE SERPL-MCNC: 111 MG/DL — HIGH (ref 70–99)
GLUCOSE SERPL-MCNC: 86 MG/DL — SIGNIFICANT CHANGE UP (ref 70–99)
GLUCOSE UR QL: NEGATIVE MG/DL — SIGNIFICANT CHANGE UP
HCT VFR BLD CALC: 24.1 % — LOW (ref 34.5–45)
HCT VFR BLD CALC: 24.1 % — LOW (ref 34.5–45)
HCT VFR BLD CALC: 25.3 % — LOW (ref 34.5–45)
HGB BLD-MCNC: 7.7 G/DL — LOW (ref 11.5–15.5)
HGB BLD-MCNC: 8.3 G/DL — LOW (ref 11.5–15.5)
HGB BLD-MCNC: 8.3 G/DL — LOW (ref 11.5–15.5)
IMM GRANULOCYTES NFR BLD AUTO: 0.2 % — SIGNIFICANT CHANGE UP (ref 0–1.5)
INR BLD: 1.48 RATIO — HIGH (ref 0.88–1.16)
KETONES UR-MCNC: NEGATIVE — SIGNIFICANT CHANGE UP
LEUKOCYTE ESTERASE UR-ACNC: NEGATIVE — SIGNIFICANT CHANGE UP
LYMPHOCYTES # BLD AUTO: 0.44 K/UL — LOW (ref 1–3.3)
LYMPHOCYTES # BLD AUTO: 9.6 % — LOW (ref 13–44)
MCHC RBC-ENTMCNC: 30.7 PG — SIGNIFICANT CHANGE UP (ref 27–34)
MCHC RBC-ENTMCNC: 32 GM/DL — SIGNIFICANT CHANGE UP (ref 32–36)
MCHC RBC-ENTMCNC: 32.3 PG — SIGNIFICANT CHANGE UP (ref 27–34)
MCHC RBC-ENTMCNC: 32.8 GM/DL — SIGNIFICANT CHANGE UP (ref 32–36)
MCHC RBC-ENTMCNC: 33.3 PG — SIGNIFICANT CHANGE UP (ref 27–34)
MCHC RBC-ENTMCNC: 34.4 GM/DL — SIGNIFICANT CHANGE UP (ref 32–36)
MCV RBC AUTO: 96 FL — SIGNIFICANT CHANGE UP (ref 80–100)
MCV RBC AUTO: 97 FL — SIGNIFICANT CHANGE UP (ref 80–100)
MCV RBC AUTO: 98.3 FL — SIGNIFICANT CHANGE UP (ref 80–100)
MONOCYTES # BLD AUTO: 0.53 K/UL — SIGNIFICANT CHANGE UP (ref 0–0.9)
MONOCYTES NFR BLD AUTO: 11.5 % — SIGNIFICANT CHANGE UP (ref 2–14)
NEUTROPHILS # BLD AUTO: 3.49 K/UL — SIGNIFICANT CHANGE UP (ref 1.8–7.4)
NEUTROPHILS NFR BLD AUTO: 75.8 % — SIGNIFICANT CHANGE UP (ref 43–77)
NITRITE UR-MCNC: NEGATIVE — SIGNIFICANT CHANGE UP
OB PNL STL: NEGATIVE — SIGNIFICANT CHANGE UP
PH UR: 5.5 — SIGNIFICANT CHANGE UP (ref 5–8)
PLATELET # BLD AUTO: 163 K/UL — SIGNIFICANT CHANGE UP (ref 150–400)
PLATELET # BLD AUTO: 164 K/UL — SIGNIFICANT CHANGE UP (ref 150–400)
PLATELET # BLD AUTO: 189 K/UL — SIGNIFICANT CHANGE UP (ref 150–400)
POTASSIUM SERPL-MCNC: 4 MMOL/L — SIGNIFICANT CHANGE UP (ref 3.5–5.3)
POTASSIUM SERPL-MCNC: 4.3 MMOL/L — SIGNIFICANT CHANGE UP (ref 3.5–5.3)
POTASSIUM SERPL-SCNC: 4 MMOL/L — SIGNIFICANT CHANGE UP (ref 3.5–5.3)
POTASSIUM SERPL-SCNC: 4.3 MMOL/L — SIGNIFICANT CHANGE UP (ref 3.5–5.3)
POTASSIUM UR-SCNC: 36 MMOL/L — SIGNIFICANT CHANGE UP
PROT ?TM UR-MCNC: 8 MG/DL — SIGNIFICANT CHANGE UP (ref 0–12)
PROT UR-MCNC: NEGATIVE MG/DL — SIGNIFICANT CHANGE UP
PROT/CREAT UR-RTO: 0.1 RATIO — SIGNIFICANT CHANGE UP (ref 0–0.2)
PROTHROM AB SERPL-ACNC: 16.9 SEC — HIGH (ref 10–13.1)
RBC # BLD: 2.49 M/UL — LOW (ref 3.8–5.2)
RBC # BLD: 2.51 M/UL — LOW (ref 3.8–5.2)
RBC # BLD: 2.57 M/UL — LOW (ref 3.8–5.2)
RBC # FLD: 18.9 % — HIGH (ref 10.3–14.5)
RBC # FLD: 19.1 % — HIGH (ref 10.3–14.5)
RBC # FLD: 21.3 % — HIGH (ref 10.3–14.5)
SODIUM SERPL-SCNC: 137 MMOL/L — SIGNIFICANT CHANGE UP (ref 135–145)
SODIUM SERPL-SCNC: 139 MMOL/L — SIGNIFICANT CHANGE UP (ref 135–145)
SODIUM UR-SCNC: 31 MMOL/L — SIGNIFICANT CHANGE UP
SP GR SPEC: 1.02 — SIGNIFICANT CHANGE UP (ref 1.01–1.02)
UROBILINOGEN FLD QL: NEGATIVE MG/DL — SIGNIFICANT CHANGE UP
UUN UR-MCNC: 659 MG/DL — SIGNIFICANT CHANGE UP
WBC # BLD: 4.6 K/UL — SIGNIFICANT CHANGE UP (ref 3.8–10.5)
WBC # BLD: 5.7 K/UL — SIGNIFICANT CHANGE UP (ref 3.8–10.5)
WBC # BLD: 6 K/UL — SIGNIFICANT CHANGE UP (ref 3.8–10.5)
WBC # FLD AUTO: 4.6 K/UL — SIGNIFICANT CHANGE UP (ref 3.8–10.5)
WBC # FLD AUTO: 5.7 K/UL — SIGNIFICANT CHANGE UP (ref 3.8–10.5)
WBC # FLD AUTO: 6 K/UL — SIGNIFICANT CHANGE UP (ref 3.8–10.5)

## 2018-01-12 PROCEDURE — 99232 SBSQ HOSP IP/OBS MODERATE 35: CPT

## 2018-01-12 PROCEDURE — 76775 US EXAM ABDO BACK WALL LIM: CPT | Mod: 26

## 2018-01-12 RX ORDER — HEPARIN SODIUM 5000 [USP'U]/ML
4000 INJECTION INTRAVENOUS; SUBCUTANEOUS ONCE
Qty: 0 | Refills: 0 | Status: COMPLETED | OUTPATIENT
Start: 2018-01-12 | End: 2018-01-12

## 2018-01-12 RX ORDER — HEPARIN SODIUM 5000 [USP'U]/ML
2000 INJECTION INTRAVENOUS; SUBCUTANEOUS EVERY 6 HOURS
Qty: 0 | Refills: 0 | Status: DISCONTINUED | OUTPATIENT
Start: 2018-01-12 | End: 2018-01-16

## 2018-01-12 RX ORDER — HEPARIN SODIUM 5000 [USP'U]/ML
INJECTION INTRAVENOUS; SUBCUTANEOUS
Qty: 25000 | Refills: 0 | Status: DISCONTINUED | OUTPATIENT
Start: 2018-01-12 | End: 2018-01-16

## 2018-01-12 RX ORDER — HEPARIN SODIUM 5000 [USP'U]/ML
4000 INJECTION INTRAVENOUS; SUBCUTANEOUS EVERY 6 HOURS
Qty: 0 | Refills: 0 | Status: DISCONTINUED | OUTPATIENT
Start: 2018-01-12 | End: 2018-01-16

## 2018-01-12 RX ADMIN — AMIODARONE HYDROCHLORIDE 200 MILLIGRAM(S): 400 TABLET ORAL at 05:31

## 2018-01-12 RX ADMIN — HEPARIN SODIUM 4000 UNIT(S): 5000 INJECTION INTRAVENOUS; SUBCUTANEOUS at 20:13

## 2018-01-12 RX ADMIN — HEPARIN SODIUM 900 UNIT(S)/HR: 5000 INJECTION INTRAVENOUS; SUBCUTANEOUS at 20:13

## 2018-01-12 RX ADMIN — CARVEDILOL PHOSPHATE 3.12 MILLIGRAM(S): 80 CAPSULE, EXTENDED RELEASE ORAL at 17:05

## 2018-01-12 RX ADMIN — CARVEDILOL PHOSPHATE 3.12 MILLIGRAM(S): 80 CAPSULE, EXTENDED RELEASE ORAL at 05:31

## 2018-01-12 RX ADMIN — Medication 1 MILLIGRAM(S): at 11:53

## 2018-01-12 RX ADMIN — DONEPEZIL HYDROCHLORIDE 10 MILLIGRAM(S): 10 TABLET, FILM COATED ORAL at 22:12

## 2018-01-12 NOTE — CONSULT NOTE ADULT - SUBJECTIVE AND OBJECTIVE BOX
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  No sick contact, compliant with meds and diet  nephro consult called for peace on ?ckd. Pt was on losartan which has been discontines and recieving prn lasix. denies any nsaid use.   denies f/c/n/v/d/c/sob    PAST MEDICAL & SURGICAL HISTORY:  Paroxysmal a-fib  CHF (congestive heart failure)  No significant past surgical history    No Known Allergies    Home Medications Reviewed  Hospital Medications:   MEDICATIONS  (STANDING):  amiodarone    Tablet 200 milliGRAM(s) Oral daily  carvedilol 3.125 milliGRAM(s) Oral every 12 hours  donepezil 10 milliGRAM(s) Oral at bedtime  folic acid 1 milliGRAM(s) Oral daily    SOCIAL HISTORY:  Denies ETOh,Smoking,   FAMILY HISTORY:  No pertinent family history in first degree relatives    REVIEW OF SYSTEMS:  CONSTITUTIONAL: No weakness, fevers or chills  EYES/ENT: No visual changes;  No vertigo or throat pain   NECK: No pain or stiffness  RESPIRATORY: No cough, wheezing, hemoptysis; No shortness of breath  CARDIOVASCULAR: No chest pain or palpitations.  GASTROINTESTINAL: No abdominal or epigastric pain. No nausea, vomiting, or hematemesis; No diarrhea or constipation. No melena or hematochezia.  GENITOURINARY: No dysuria, frequency, foamy urine, urinary urgency, incontinence or hematuria  NEUROLOGICAL: No numbness or weakness  SKIN: No itching, burning, rashes, or lesions   VASCULAR: No bilateral lower extremity edema.   All other review of systems is negative unless indicated above.    VITALS:  T(F): 98 (01-12-18 @ 04:21), Max: 98.2 (01-11-18 @ 12:30)  HR: 70 (01-12-18 @ 05:30)  BP: 127/65 (01-12-18 @ 05:30)  RR: 17 (01-12-18 @ 04:21)  SpO2: 93% (01-12-18 @ 04:21)  Wt(kg): --    01-11 @ 07:01  -  01-12 @ 07:00  --------------------------------------------------------  IN: 1080 mL / OUT: 1500 mL / NET: -420 mL        PHYSICAL EXAM:  Constitutional: NAD  HEENT: anicteric sclera, oropharynx clear, MMM  Neck: No JVD  Respiratory:b/l rhonchi  Cardiovascular: S1, S2, RRR  Gastrointestinal: BS+, soft, NT/ND  Extremities: No cyanosis or clubbing. No peripheral edema  Neurological: A/O x 3, no focal deficits  Psychiatric: Normal mood, normal affect    LABS:  01-12    139  |  99  |  65<H>  ----------------------------<  111<H>  4.0   |  25  |  1.80<H>    Ca    8.7      12 Jan 2018 07:57      Creatinine Trend: 1.80 <--, 2.20 <--, 2.40 <--, 2.24 <--                        7.7    4.60  )-----------( 189      ( 12 Jan 2018 07:46 )             24.1     Urine Studies:      RADIOLOGY & ADDITIONAL STUDIES:

## 2018-01-12 NOTE — CHART NOTE - NSCHARTNOTEFT_GEN_A_CORE
consulted with MD Anna regarding patient been on heparin, stated consulted w/ GI occult blood feces negative and cardio stated patient has a mechanical valve.

## 2018-01-12 NOTE — CONSULT NOTE ADULT - PROBLEM SELECTOR RECOMMENDATION 9
pre renal vs atn vs obstruction  cr improving  check ua   check urine pro/cr  check urine na/cr/cl/osm/k  check renal ultrasound  agree with holding losartan and digoxin trend bmp q12h  s/p lasix with prbcs  will monitor closely

## 2018-01-12 NOTE — PHYSICAL THERAPY INITIAL EVALUATION ADULT - GAIT DEVIATIONS NOTED, PT EVAL
decreased ryan/decreased step length/decreased stride length/decreased weight-shifting ability/increased time in double stance/decreased velocity of limb motion

## 2018-01-12 NOTE — PROGRESS NOTE ADULT - ASSESSMENT
89 F Hx MVR (tissue/porcine) x 2 (1992, 1998), chronic CHF (unclear systolic/diastolic), Afib on coumadin, mild dementia p/w progressive ESCALERA a/w suspected acute decompensated CHF, possibly symptomatic anemia and SHANTEL. 89 F Hx MVR (tissue/porcine) x 2 (1992, 1998), chronic CHF (unclear systolic/diastolic), Afib on coumadin, mild dementia p/w progressive ESCALERA a/w suspected acute decompensated CHF, possibly symptomatic anemia and SHANTEL.      Dw cardio and GI. Considering benefits vs risk of AC, Benefits outweights risks. Will start IV Heparin.

## 2018-01-12 NOTE — PHYSICAL THERAPY INITIAL EVALUATION ADULT - PERTINENT HX OF CURRENT PROBLEM, REHAB EVAL
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

## 2018-01-12 NOTE — PROGRESS NOTE ADULT - SUBJECTIVE AND OBJECTIVE BOX
[facility]  Luis Obrien PA-C  [address] [place] [id number]  [phone]      INTERVAL HPI/OVERNIGHT EVENTS:    brown stool  occult negative    MEDICATIONS  (STANDING):  amiodarone    Tablet 200 milliGRAM(s) Oral daily  carvedilol 3.125 milliGRAM(s) Oral every 12 hours  donepezil 10 milliGRAM(s) Oral at bedtime  folic acid 1 milliGRAM(s) Oral daily    MEDICATIONS  (PRN):  acetaminophen   Tablet. 650 milliGRAM(s) Oral once PRN Moderate Pain (4 - 6)      Allergies    No Known Allergies    Intolerances        ROS:   General:  No wt loss, fevers, chills, night sweats, fatigue,   Eyes:  Good vision, no reported pain  ENT:  No sore throat, pain, runny nose, dysphagia  CV:  No pain, palpitations, hypo/hypertension  Resp:  No dyspnea, cough, tachypnea, wheezing  GI:  No pain, No nausea, No vomiting, No diarrhea, No constipation, No weight loss, No fever, No pruritis, No rectal bleeding, No tarry stools, No dysphagia,  :  No pain, bleeding, incontinence, nocturia  Muscle:  No pain, weakness  Neuro:  No weakness, tingling, memory problems  Psych:  No fatigue, insomnia, mood problems, depression  Endocrine:  No polyuria, polydipsia, cold/heat intolerance  Heme:  No petechiae, ecchymosis, easy bruisability  Skin:  No rash, tattoos, scars, edema      PHYSICAL EXAM:   Vital Signs:  Vital Signs Last 24 Hrs  T(C): 36.5 (2018 11:40), Max: 36.8 (2018 17:45)  T(F): 97.7 (2018 11:40), Max: 98.2 (2018 17:45)  HR: 71 (2018 17:04) (61 - 71)  BP: 136/66 (2018 17:04) (116/70 - 152/67)  BP(mean): --  RR: 16 (2018 11:40) (16 - 18)  SpO2: 96% (2018 11:40) (93% - 98%)  Daily     Daily     GENERAL:  Appears stated age, well-groomed, well-nourished, no distress  HEENT:  NC/AT,  conjunctivae clear and pink, no thyromegaly, nodules, adenopathy, no JVD, sclera -anicteric  CHEST:  Full & symmetric excursion, no increased effort, breath sounds clear  HEART:  Regular rhythm, S1, S2, no murmur/rub/S3/S4, no abdominal bruit, no edema  ABDOMEN:  Soft, non-tender, non-distended, normoactive bowel sounds,  no masses ,no hepato-splenomegaly, no signs of chronic liver disease  EXTEREMITIES:  no cyanosis,clubbing or edema  SKIN:  No rash/erythema/ecchymoses/petechiae/wounds/abscess/warm/dry  NEURO:  Alert, oriented, no asterixis, no tremor, no encephalopathy      LABS:                        8.3    5.7   )-----------( 164      ( 2018 12:03 )             25.3     01-12    137  |  98  |  63<H>  ----------------------------<  86  4.3   |  28  |  2.04<H>    Ca    8.7      2018 12:03      PT/INR - ( 2018 07:46 )   PT: 16.9 sec;   INR: 1.48 ratio         PTT - ( 2018 07:46 )  PTT:40.5 sec  Urinalysis Basic - ( 2018 15:01 )    Color: Yellow / Appearance: Clear / S.018 / pH: x  Gluc: x / Ketone: Negative  / Bili: Negative / Urobili: Negative mg/dL   Blood: x / Protein: Negative mg/dL / Nitrite: Negative   Leuk Esterase: Negative / RBC: x / WBC x   Sq Epi: x / Non Sq Epi: x / Bacteria: x        RADIOLOGY & ADDITIONAL TESTS:

## 2018-01-12 NOTE — PROGRESS NOTE ADULT - SUBJECTIVE AND OBJECTIVE BOX
Patient is a 89y old  Female who presents with a chief complaint of SOB (10 Colt 2018 07:35)      SUBJECTIVE / OVERNIGHT EVENTS:   Feels better.  Denies CP/SOB/Palpitation/HA.    MEDICATIONS  (STANDING):  amiodarone    Tablet 200 milliGRAM(s) Oral daily  carvedilol 3.125 milliGRAM(s) Oral every 12 hours  donepezil 10 milliGRAM(s) Oral at bedtime  folic acid 1 milliGRAM(s) Oral daily    MEDICATIONS  (PRN):  acetaminophen   Tablet. 650 milliGRAM(s) Oral once PRN Moderate Pain (4 - 6)        CAPILLARY BLOOD GLUCOSE        I&O's Summary    2018 07:  -  2018 07:00  --------------------------------------------------------  IN: 1080 mL / OUT: 1500 mL / NET: -420 mL    2018 07:01  -  2018 17:51  --------------------------------------------------------  IN: 360 mL / OUT: 60 mL / NET: 300 mL        PHYSICAL EXAM:  GENERAL: NAD, well-developed  HEAD:  Atraumatic, Normocephalic  NECK: Supple, No JVD  CHEST/LUNG: Clear to auscultation bilaterally; No wheezing.  HEART: Regular rate and rhythm; No murmurs, rubs, or gallops  ABDOMEN: Soft, Nontender, Nondistended; Bowel sounds present  EXTREMITIES:   No clubbing, cyanosis, or edema  NEUROLOGY: AAO X 3  SKIN: No rashes    LABS:                        8.3    5.7   )-----------( 164      ( 2018 12:03 )             25.3     -12    137  |  98  |  63<H>  ----------------------------<  86  4.3   |  28  |  2.04<H>    Ca    8.7      2018 12:03      PT/INR - ( 2018 07:46 )   PT: 16.9 sec;   INR: 1.48 ratio         PTT - ( 2018 07:46 )  PTT:40.5 sec      Urinalysis Basic - ( 2018 15:01 )    Color: Yellow / Appearance: Clear / S.018 / pH: x  Gluc: x / Ketone: Negative  / Bili: Negative / Urobili: Negative mg/dL   Blood: x / Protein: Negative mg/dL / Nitrite: Negative   Leuk Esterase: Negative / RBC: x / WBC x   Sq Epi: x / Non Sq Epi: x / Bacteria: x      CAPILLARY BLOOD GLUCOSE                    RADIOLOGY & ADDITIONAL TESTS:    Imaging Personally Reviewed:    Consultant(s) Notes Reviewed:      Care Discussed with Consultants/Other Providers:

## 2018-01-12 NOTE — PROGRESS NOTE ADULT - SUBJECTIVE AND OBJECTIVE BOX
CC: SOB    Interval History: Patient feels a bit better with increased Hgb.     MEDICATIONS:  acetaminophen   Tablet. 650 milliGRAM(s) Oral once PRN  amiodarone    Tablet 200 milliGRAM(s) Oral daily  carvedilol 3.125 milliGRAM(s) Oral every 12 hours  donepezil 10 milliGRAM(s) Oral at bedtime  folic acid 1 milliGRAM(s) Oral daily      LABS:  01-12    137  |  98  |  63<H>  ----------------------------<  86  4.3   |  28  |  2.04<H>    Ca    8.7      12 Jan 2018 12:03                            8.3    5.7   )-----------( 164      ( 12 Jan 2018 12:03 )             25.3     PT/INR - ( 12 Jan 2018 07:46 )   PT: 16.9 sec;   INR: 1.48 ratio         PTT - ( 12 Jan 2018 07:46 )  PTT:40.5 sec      VITAL SIGNS:   T(C): 36.5 (01-12-18 @ 11:40), Max: 36.8 (01-11-18 @ 17:45)  HR: 69 (01-12-18 @ 11:40) (61 - 71)  BP: 128/74 (01-12-18 @ 11:40) (116/70 - 152/67)  RR: 16 (01-12-18 @ 11:40) (16 - 18)  SpO2: 96% (01-12-18 @ 11:40) (93% - 98%)  Daily     Daily   I&O's Summary    11 Jan 2018 07:01  -  12 Jan 2018 07:00  --------------------------------------------------------  IN: 1080 mL / OUT: 1500 mL / NET: -420 mL    12 Jan 2018 07:01  -  12 Jan 2018 17:04  --------------------------------------------------------  IN: 360 mL / OUT: 60 mL / NET: 300 mL        TELE: No significant ectopy

## 2018-01-12 NOTE — PHYSICAL THERAPY INITIAL EVALUATION ADULT - PRECAUTIONS/LIMITATIONS, REHAB EVAL
XRAY CHEST: Cardiomegaly with small right pleural effusion. fall precautions/XRAY CHEST: Cardiomegaly with small right pleural effusion.

## 2018-01-12 NOTE — PHYSICAL THERAPY INITIAL EVALUATION ADULT - GENERAL OBSERVATIONS, REHAB EVAL
Pt received standing up in bathroom, PT assisted back to sitting in chair, A&Ox4, following all commands

## 2018-01-12 NOTE — CONSULT NOTE ADULT - PROBLEM SELECTOR RECOMMENDATION 2
dig level is 2.5; ekg with ventricular pacing  pt asymptomatic - no Gastrointestinal (anorexia, nausea, vomiting, and abdominal pain) and neurologic signs (confusion and weakness)  cr improving  recheck digoxin level  monitor closely

## 2018-01-12 NOTE — PHYSICAL THERAPY INITIAL EVALUATION ADULT - ADDITIONAL COMMENTS
Pt resides in private home with brother in law & nephew, 0 steps to enter, pt stays on first level & has no steps to negotiate. PTA pt ambulates without AD, owns cane & rolling walker, has HHA 5dys/7hrs who assists as needed.

## 2018-01-12 NOTE — PHYSICAL THERAPY INITIAL EVALUATION ADULT - DISCHARGE DISPOSITION, PT EVAL
home with outpatient PT for improved strength balance & endurance for activity; pt owns rolling walker & cane (recommend use of rolling walker), assist from family & HHA as necessary for mobility and ADL's, pt states that she resides on first level & has 0 steps to negotiate. pt cleared for DC home by PT standpoint at this time/outpatient PT/home/home w/ assist

## 2018-01-12 NOTE — PROGRESS NOTE ADULT - ASSESSMENT
89 F with likely multifactorial dyspnea with MVR, HF unknonw EF  ·	Patient did not have increase in Hgb after PRBC and feels better.   ·	Follow up echo.   ·	Hold diuretics except with PRBC. Strict I and O.   ·	Continue other cardiac medications for now.   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

## 2018-01-12 NOTE — CONSULT NOTE ADULT - ASSESSMENT
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
89 F with likely multifactorial dyspnea with MVR, HF unknonw EF  ·	Suspect multiple factors contribute to patient's symptoms including anemia and some component of HF  ·	Will need echo to confirm EF and prosthesis function.   ·	Agree with PRBC and lasix.   ·	Strict I and O.   ·	Patient has been on amio. No evidence on exam or chest X-ray for amio pulmonary toxicity but it should be considered.   ·	Continue other medications for now. Follow up post transfusion H/H and see if symtpoms improve  ·	Renal eval for SHANTEL.

## 2018-01-13 LAB
ALBUMIN SERPL ELPH-MCNC: 3.5 G/DL — SIGNIFICANT CHANGE UP (ref 3.3–5)
ALP SERPL-CCNC: 87 U/L — SIGNIFICANT CHANGE UP (ref 40–120)
ALT FLD-CCNC: 13 U/L — SIGNIFICANT CHANGE UP (ref 10–45)
ANION GAP SERPL CALC-SCNC: 15 MMOL/L — SIGNIFICANT CHANGE UP (ref 5–17)
APTT BLD: 152 SEC — CRITICAL HIGH (ref 27.5–37.4)
APTT BLD: 52.4 SEC — HIGH (ref 27.5–37.4)
APTT BLD: 63.9 SEC — HIGH (ref 27.5–37.4)
APTT BLD: 84.4 SEC — HIGH (ref 27.5–37.4)
AST SERPL-CCNC: 39 U/L — SIGNIFICANT CHANGE UP (ref 10–40)
BILIRUB SERPL-MCNC: 1.2 MG/DL — SIGNIFICANT CHANGE UP (ref 0.2–1.2)
BUN SERPL-MCNC: 67 MG/DL — HIGH (ref 7–23)
CALCIUM SERPL-MCNC: 9 MG/DL — SIGNIFICANT CHANGE UP (ref 8.4–10.5)
CHLORIDE SERPL-SCNC: 98 MMOL/L — SIGNIFICANT CHANGE UP (ref 96–108)
CO2 SERPL-SCNC: 24 MMOL/L — SIGNIFICANT CHANGE UP (ref 22–31)
CREAT SERPL-MCNC: 2.11 MG/DL — HIGH (ref 0.5–1.3)
GLUCOSE SERPL-MCNC: 109 MG/DL — HIGH (ref 70–99)
HAPTOGLOB SERPL-MCNC: <20 MG/DL — LOW (ref 34–200)
HCT VFR BLD CALC: 25.4 % — LOW (ref 34.5–45)
HCT VFR BLD CALC: 26.9 % — LOW (ref 34.5–45)
HGB BLD-MCNC: 8.1 G/DL — LOW (ref 11.5–15.5)
HGB BLD-MCNC: 8.4 G/DL — LOW (ref 11.5–15.5)
INR BLD: 1.29 RATIO — HIGH (ref 0.88–1.16)
LDH SERPL L TO P-CCNC: 680 U/L — HIGH (ref 50–242)
MCHC RBC-ENTMCNC: 30.1 GM/DL — LOW (ref 32–36)
MCHC RBC-ENTMCNC: 30.1 PG — SIGNIFICANT CHANGE UP (ref 27–34)
MCHC RBC-ENTMCNC: 32.2 PG — SIGNIFICANT CHANGE UP (ref 27–34)
MCHC RBC-ENTMCNC: 32.9 GM/DL — SIGNIFICANT CHANGE UP (ref 32–36)
MCV RBC AUTO: 100 FL — SIGNIFICANT CHANGE UP (ref 80–100)
MCV RBC AUTO: 97.9 FL — SIGNIFICANT CHANGE UP (ref 80–100)
PLATELET # BLD AUTO: 146 K/UL — LOW (ref 150–400)
PLATELET # BLD AUTO: 188 K/UL — SIGNIFICANT CHANGE UP (ref 150–400)
POTASSIUM SERPL-MCNC: 4.7 MMOL/L — SIGNIFICANT CHANGE UP (ref 3.5–5.3)
POTASSIUM SERPL-SCNC: 4.7 MMOL/L — SIGNIFICANT CHANGE UP (ref 3.5–5.3)
PROT SERPL-MCNC: 6.8 G/DL — SIGNIFICANT CHANGE UP (ref 6–8.3)
PROTHROM AB SERPL-ACNC: 14.6 SEC — HIGH (ref 10–13.1)
RBC # BLD: 2.59 M/UL — LOW (ref 3.8–5.2)
RBC # BLD: 2.69 M/UL — LOW (ref 3.8–5.2)
RBC # FLD: 18.6 % — HIGH (ref 10.3–14.5)
RBC # FLD: 21.2 % — HIGH (ref 10.3–14.5)
SODIUM SERPL-SCNC: 137 MMOL/L — SIGNIFICANT CHANGE UP (ref 135–145)
WBC # BLD: 5.06 K/UL — SIGNIFICANT CHANGE UP (ref 3.8–10.5)
WBC # BLD: 6 K/UL — SIGNIFICANT CHANGE UP (ref 3.8–10.5)
WBC # FLD AUTO: 5.06 K/UL — SIGNIFICANT CHANGE UP (ref 3.8–10.5)
WBC # FLD AUTO: 6 K/UL — SIGNIFICANT CHANGE UP (ref 3.8–10.5)

## 2018-01-13 PROCEDURE — 93306 TTE W/DOPPLER COMPLETE: CPT | Mod: 26

## 2018-01-13 PROCEDURE — 99232 SBSQ HOSP IP/OBS MODERATE 35: CPT

## 2018-01-13 RX ORDER — WARFARIN SODIUM 2.5 MG/1
2.5 TABLET ORAL ONCE
Qty: 0 | Refills: 0 | Status: COMPLETED | OUTPATIENT
Start: 2018-01-13 | End: 2018-01-13

## 2018-01-13 RX ADMIN — HEPARIN SODIUM 2000 UNIT(S): 5000 INJECTION INTRAVENOUS; SUBCUTANEOUS at 23:13

## 2018-01-13 RX ADMIN — Medication 650 MILLIGRAM(S): at 21:13

## 2018-01-13 RX ADMIN — HEPARIN SODIUM 0 UNIT(S)/HR: 5000 INJECTION INTRAVENOUS; SUBCUTANEOUS at 02:44

## 2018-01-13 RX ADMIN — HEPARIN SODIUM 800 UNIT(S)/HR: 5000 INJECTION INTRAVENOUS; SUBCUTANEOUS at 03:45

## 2018-01-13 RX ADMIN — DONEPEZIL HYDROCHLORIDE 10 MILLIGRAM(S): 10 TABLET, FILM COATED ORAL at 21:14

## 2018-01-13 RX ADMIN — Medication 1 MILLIGRAM(S): at 11:26

## 2018-01-13 RX ADMIN — HEPARIN SODIUM 900 UNIT(S)/HR: 5000 INJECTION INTRAVENOUS; SUBCUTANEOUS at 23:10

## 2018-01-13 RX ADMIN — CARVEDILOL PHOSPHATE 3.12 MILLIGRAM(S): 80 CAPSULE, EXTENDED RELEASE ORAL at 21:14

## 2018-01-13 RX ADMIN — HEPARIN SODIUM 800 UNIT(S)/HR: 5000 INJECTION INTRAVENOUS; SUBCUTANEOUS at 11:19

## 2018-01-13 RX ADMIN — CARVEDILOL PHOSPHATE 3.12 MILLIGRAM(S): 80 CAPSULE, EXTENDED RELEASE ORAL at 05:30

## 2018-01-13 RX ADMIN — AMIODARONE HYDROCHLORIDE 200 MILLIGRAM(S): 400 TABLET ORAL at 05:30

## 2018-01-13 RX ADMIN — Medication 650 MILLIGRAM(S): at 22:30

## 2018-01-13 RX ADMIN — WARFARIN SODIUM 2.5 MILLIGRAM(S): 2.5 TABLET ORAL at 21:14

## 2018-01-13 RX ADMIN — HEPARIN SODIUM 800 UNIT(S)/HR: 5000 INJECTION INTRAVENOUS; SUBCUTANEOUS at 09:49

## 2018-01-13 NOTE — PROGRESS NOTE ADULT - SUBJECTIVE AND OBJECTIVE BOX
Ms. Pulido reports that her dyspnea has improved over the hospital course.    Vital Signs Last 24 Hrs  T(C): 36.3 (13 Jan 2018 11:39), Max: 37.1 (12 Jan 2018 20:05)  T(F): 97.3 (13 Jan 2018 11:39), Max: 98.7 (12 Jan 2018 20:05)  HR: 70 (13 Jan 2018 11:39) (69 - 71)  BP: 124/67 (13 Jan 2018 11:39) (112/50 - 145/70)  RR: 18 (13 Jan 2018 11:39) (17 - 18)  SpO2: 97% (13 Jan 2018 11:39) (95% - 97%)    General:  awake, alert, sitting upright  CV:  regular, systolic murmur  Lungs:  CTAB  Abdomen:  soft, non-tender  Extremities:  no RENETTA    01-13    137  |  98  |  67<H>  ----------------------------<  109<H>  4.7   |  24  |  2.11<H>                          8.1    5.06  )-----------( 188      ( 13 Jan 2018 08:37 )             26.9     MEDICATIONS  (STANDING):  amiodarone    Tablet 200 milliGRAM(s) Oral daily  carvedilol 3.125 milliGRAM(s) Oral every 12 hours  donepezil 10 milliGRAM(s) Oral at bedtime  folic acid 1 milliGRAM(s) Oral daily  heparin  Infusion.  Unit(s)/Hr (9 mL/Hr) IV Continuous <Continuous> Ms. Pulido reports that her dyspnea has improved over the hospital course.    Vital Signs Last 24 Hrs  T(C): 36.3 (13 Jan 2018 11:39), Max: 37.1 (12 Jan 2018 20:05)  T(F): 97.3 (13 Jan 2018 11:39), Max: 98.7 (12 Jan 2018 20:05)  HR: 70 (13 Jan 2018 11:39) (69 - 71)  BP: 124/67 (13 Jan 2018 11:39) (112/50 - 145/70)  RR: 18 (13 Jan 2018 11:39) (17 - 18)  SpO2: 97% (13 Jan 2018 11:39) (95% - 97%)    General:  awake, alert, sitting upright  CV:  regular, systolic murmur  Lungs:  CTAB  Abdomen:  soft, non-tender  Extremities:  no RENETTA    01-13    137  |  98  |  67<H>  ----------------------------<  109<H>  4.7   |  24  |  2.11<H>                          8.1    5.06  )-----------( 188      ( 13 Jan 2018 08:37 )             26.9     MEDICATIONS  (STANDING):  amiodarone    Tablet 200 milliGRAM(s) Oral daily  carvedilol 3.125 milliGRAM(s) Oral every 12 hours  donepezil 10 milliGRAM(s) Oral at bedtime  folic acid 1 milliGRAM(s) Oral daily  heparin  Infusion.  Unit(s)/Hr (9 mL/Hr) IV Continuous Ms. Pulido reports that her dyspnea has improved over the hospital course.    Vital Signs Last 24 Hrs  T(C): 36.3 (13 Jan 2018 11:39), Max: 37.1 (12 Jan 2018 20:05)  T(F): 97.3 (13 Jan 2018 11:39), Max: 98.7 (12 Jan 2018 20:05)  HR: 70 (13 Jan 2018 11:39) (69 - 71)  BP: 124/67 (13 Jan 2018 11:39) (112/50 - 145/70)  RR: 18 (13 Jan 2018 11:39) (17 - 18)  SpO2: 97% (13 Jan 2018 11:39) (95% - 97%)    General:  awake, alert, sitting upright  CV:  regular, systolic murmur  Lungs:  CTAB  Abdomen:  soft, non-tender  Extremities:  no RENETTA    Telemetry  V-paced 70 bpm    01-13    137  |  98  |  67<H>  ----------------------------<  109<H>  4.7   |  24  |  2.11<H>                          8.1    5.06  )-----------( 188      ( 13 Jan 2018 08:37 )             26.9     MEDICATIONS  (STANDING):  amiodarone    Tablet 200 milliGRAM(s) Oral daily  carvedilol 3.125 milliGRAM(s) Oral every 12 hours  donepezil 10 milliGRAM(s) Oral at bedtime  folic acid 1 milliGRAM(s) Oral daily  heparin  Infusion.  Unit(s)/Hr (9 mL/Hr) IV Continuous

## 2018-01-13 NOTE — PROGRESS NOTE ADULT - SUBJECTIVE AND OBJECTIVE BOX
INTERVAL HPI/OVERNIGHT EVENTS:    MEDICATIONS  (STANDING):  amiodarone    Tablet 200 milliGRAM(s) Oral daily  carvedilol 3.125 milliGRAM(s) Oral every 12 hours  donepezil 10 milliGRAM(s) Oral at bedtime  folic acid 1 milliGRAM(s) Oral daily  heparin  Infusion.  Unit(s)/Hr (9 mL/Hr) IV Continuous <Continuous>    MEDICATIONS  (PRN):  acetaminophen   Tablet. 650 milliGRAM(s) Oral once PRN Moderate Pain (4 - 6)  heparin  Injectable 4000 Unit(s) IV Push every 6 hours PRN For aPTT less than 40  heparin  Injectable 2000 Unit(s) IV Push every 6 hours PRN For aPTT between 40 - 57      Allergies    No Known Allergies    Intolerances        Review of Systems:    General:  No wt loss, fevers, chills, night sweats,fatigue,   Eyes:  Good vision, no reported pain  ENT:  No sore throat, pain, runny nose, dysphagia  CV:  No pain, palpitatioins, hypo/hypertension  Resp:  No dyspnea, cough, tachypnea, wheezing  GI:  No pain, No nausea, No vomiting, No diarrhea, No constipatiion, No weight loss, No fever, No pruritis, No rectal bleeding, No tarry stools, No dysphagia,  :  No pain, bleeding, incontinence, nocturia  Muscle:  No pain, weakness  Neuro:  No weakness, tingling, memory problems  Psych:  No fatigue, insomnia, mood problems, depression  Endocrine:  No polyuria, polydypsia, cold/heat intolerance  Heme:  No petechiae, ecchymosis, easy bruisability  Skin:  No rash, tattoos, scars, edema      Vital Signs Last 24 Hrs  T(C): 36.3 (2018 11:39), Max: 37.1 (2018 20:05)  T(F): 97.3 (2018 11:39), Max: 98.7 (2018 20:05)  HR: 70 (2018 11:39) (69 - 71)  BP: 124/67 (2018 11:39) (112/50 - 145/70)  BP(mean): --  RR: 18 (2018 11:39) (17 - 18)  SpO2: 97% (2018 11:39) (95% - 97%)    PHYSICAL EXAM:    Constitutional: NAD, well-developed  HEENT: EOMI, throat clear  Neck: No LAD, supple  Respiratory: CTA and P  Cardiovascular: S1 and S2, RRR, no M  Gastrointestinal: BS+, soft, NT/ND, neg HSM,  Extremities: No peripheral edema, neg clubing, cyanosis  Vascular: 2+ peripheral pulses  Neurological: A/O x 3, no focal deficits  Psychiatric: Normal mood, normal affect  Skin: No rashes      LABS:                        8.1    5.06  )-----------( 188      ( 2018 08:37 )             26.9         137  |  98  |  67<H>  ----------------------------<  109<H>  4.7   |  24  |  2.11<H>    Ca    9.0      2018 08:55    TPro  6.8  /  Alb  3.5  /  TBili  1.2  /  DBili  x   /  AST  39  /  ALT  13  /  AlkPhos  87      PT/INR - ( 2018 08:37 )   PT: 14.6 sec;   INR: 1.29 ratio         PTT - ( 2018 10:51 )  PTT:84.4 sec  Urinalysis Basic - ( 2018 15:01 )    Color: Yellow / Appearance: Clear / S.018 / pH: x  Gluc: x / Ketone: Negative  / Bili: Negative / Urobili: Negative mg/dL   Blood: x / Protein: Negative mg/dL / Nitrite: Negative   Leuk Esterase: Negative / RBC: x / WBC x   Sq Epi: x / Non Sq Epi: x / Bacteria: x        RADIOLOGY & ADDITIONAL TESTS:

## 2018-01-13 NOTE — PROGRESS NOTE ADULT - SUBJECTIVE AND OBJECTIVE BOX
Patient seen and examined  no complaints    No Known Allergies    Hospital Medications:   MEDICATIONS  (STANDING):  amiodarone    Tablet 200 milliGRAM(s) Oral daily  carvedilol 3.125 milliGRAM(s) Oral every 12 hours  donepezil 10 milliGRAM(s) Oral at bedtime  folic acid 1 milliGRAM(s) Oral daily  heparin  Infusion.  Unit(s)/Hr (9 mL/Hr) IV Continuous <Continuous>        VITALS:  T(F): 97.6 (18 @ 08:42), Max: 98.7 (18 @ 20:05)  HR: 69 (18 @ 08:42)  BP: 112/50 (18 @ 08:42)  RR: 18 (18 @ 08:42)  SpO2: 96% (18 @ 08:42)  Wt(kg): --     @ 07:01  -   @ 07:00  --------------------------------------------------------  IN: 1184 mL / OUT: 735 mL / NET: 449 mL  PHYSICAL EXAM:  Constitutional: NAD  HEENT: anicteric sclera, oropharynx clear, MMM  Neck: No JVD  Respiratory:b/l rhonchi  Cardiovascular: S1, S2, RRR  Gastrointestinal: BS+, soft, NT/ND  Extremities: No cyanosis or clubbing. No peripheral edema  Neurological: A/O x 3, no focal deficits  Psychiatric: Normal mood, normal affect    LABS:      137  |  98  |  63<H>  ----------------------------<  86  4.3   |  28  |  2.04<H>    Ca    8.7      2018 12:03      Creatinine Trend: 2.04 <--, 1.80 <--, 2.20 <--, 2.40 <--, 2.24 <--                        8.1    5.06  )-----------( 188      ( 2018 08:37 )             26.9     Urine Studies:  Urinalysis Basic - ( 2018 15:01 )    Color: Yellow / Appearance: Clear / S.018 / pH:   Gluc:  / Ketone: Negative  / Bili: Negative / Urobili: Negative mg/dL   Blood:  / Protein: Negative mg/dL / Nitrite: Negative   Leuk Esterase: Negative / RBC:  / WBC    Sq Epi:  / Non Sq Epi:  / Bacteria:       Chloride, Random Urine: <20 mmol/L ( @ 16:33)  Sodium, Random Urine: 31 mmol/L ( @ 16:33)  Potassium, Random Urine: 36 mmol/L ( @ 16:33)  Creatinine, Random Urine: 71 mg/dL ( @ 16:22)  Protein/Creatinine Ratio Calculation: 0.1 Ratio ( @ :22)    RADIOLOGY & ADDITIONAL STUDIES:

## 2018-01-13 NOTE — PROGRESS NOTE ADULT - ASSESSMENT
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

## 2018-01-13 NOTE — PROGRESS NOTE ADULT - ASSESSMENT
89 F Hx MVR (tissue/porcine) x 2 (1992, 1998), chronic CHF (unclear systolic/diastolic), Afib on coumadin, mild dementia p/w progressive ESCALERA a/w suspected acute decompensated CHF, possibly symptomatic anemia and SHANTEL.      Dw cardio and GI. Considering benefits vs risk of AC, Benefits outweights risks. Will start IV Heparin.    Anemia: Hemolytic:  Art pt's sons - both are physicians.  Will hold for colonoscopy.  Coumadin tonight.

## 2018-01-13 NOTE — PROVIDER CONTACT NOTE (OTHER) - BACKGROUND
Patient admitted on 1/10/17 for heart failure and has PMH of paroxysmal afib and SHANTEL. Pt on full anticoagulation nomogram; target PTT 58-99.

## 2018-01-13 NOTE — PROGRESS NOTE ADULT - ASSESSMENT
89W  mechanical valve  AF  s/p ICD  anemia - high LDH and low haptoglobin compatible with component of hemolytic anemia  renal failure 89W  mechanical valve  AF  s/p ICD  anemia - high LDH and low haptoglobin compatible with component of hemolytic anemia  renal failure    TTE pending  Continue heparin gtt

## 2018-01-14 LAB
ALBUMIN SERPL ELPH-MCNC: 3.4 G/DL — SIGNIFICANT CHANGE UP (ref 3.3–5)
ALP SERPL-CCNC: 91 U/L — SIGNIFICANT CHANGE UP (ref 40–120)
ALT FLD-CCNC: 13 U/L — SIGNIFICANT CHANGE UP (ref 10–45)
ANION GAP SERPL CALC-SCNC: 11 MMOL/L — SIGNIFICANT CHANGE UP (ref 5–17)
APTT BLD: 137.8 SEC — CRITICAL HIGH (ref 27.5–37.4)
APTT BLD: 71 SEC — HIGH (ref 27.5–37.4)
AST SERPL-CCNC: 37 U/L — SIGNIFICANT CHANGE UP (ref 10–40)
BILIRUB SERPL-MCNC: 0.9 MG/DL — SIGNIFICANT CHANGE UP (ref 0.2–1.2)
BUN SERPL-MCNC: 74 MG/DL — HIGH (ref 7–23)
CALCIUM SERPL-MCNC: 9.2 MG/DL — SIGNIFICANT CHANGE UP (ref 8.4–10.5)
CHLORIDE SERPL-SCNC: 97 MMOL/L — SIGNIFICANT CHANGE UP (ref 96–108)
CO2 SERPL-SCNC: 25 MMOL/L — SIGNIFICANT CHANGE UP (ref 22–31)
CREAT SERPL-MCNC: 2.07 MG/DL — HIGH (ref 0.5–1.3)
GLUCOSE SERPL-MCNC: 107 MG/DL — HIGH (ref 70–99)
HCT VFR BLD CALC: 23.7 % — LOW (ref 34.5–45)
HGB BLD-MCNC: 7.7 G/DL — LOW (ref 11.5–15.5)
INR BLD: 1.22 RATIO — HIGH (ref 0.88–1.16)
MCHC RBC-ENTMCNC: 31.8 PG — SIGNIFICANT CHANGE UP (ref 27–34)
MCHC RBC-ENTMCNC: 32.5 GM/DL — SIGNIFICANT CHANGE UP (ref 32–36)
MCV RBC AUTO: 97.9 FL — SIGNIFICANT CHANGE UP (ref 80–100)
OSMOLALITY UR: 396 MOS/KG — SIGNIFICANT CHANGE UP (ref 50–1200)
PLATELET # BLD AUTO: 134 K/UL — LOW (ref 150–400)
POTASSIUM SERPL-MCNC: 4.2 MMOL/L — SIGNIFICANT CHANGE UP (ref 3.5–5.3)
POTASSIUM SERPL-SCNC: 4.2 MMOL/L — SIGNIFICANT CHANGE UP (ref 3.5–5.3)
PROT SERPL-MCNC: 6.7 G/DL — SIGNIFICANT CHANGE UP (ref 6–8.3)
PROTHROM AB SERPL-ACNC: 13.2 SEC — HIGH (ref 9.8–12.7)
RBC # BLD: 2.42 M/UL — LOW (ref 3.8–5.2)
RBC # FLD: 20.1 % — HIGH (ref 10.3–14.5)
SODIUM SERPL-SCNC: 133 MMOL/L — LOW (ref 135–145)
WBC # BLD: 3.61 K/UL — LOW (ref 3.8–10.5)
WBC # FLD AUTO: 3.61 K/UL — LOW (ref 3.8–10.5)

## 2018-01-14 RX ORDER — WARFARIN SODIUM 2.5 MG/1
2.5 TABLET ORAL ONCE
Qty: 0 | Refills: 0 | Status: COMPLETED | OUTPATIENT
Start: 2018-01-14 | End: 2018-01-14

## 2018-01-14 RX ADMIN — Medication 1 MILLIGRAM(S): at 12:07

## 2018-01-14 RX ADMIN — HEPARIN SODIUM 0 UNIT(S)/HR: 5000 INJECTION INTRAVENOUS; SUBCUTANEOUS at 06:49

## 2018-01-14 RX ADMIN — CARVEDILOL PHOSPHATE 3.12 MILLIGRAM(S): 80 CAPSULE, EXTENDED RELEASE ORAL at 05:02

## 2018-01-14 RX ADMIN — AMIODARONE HYDROCHLORIDE 200 MILLIGRAM(S): 400 TABLET ORAL at 05:02

## 2018-01-14 RX ADMIN — CARVEDILOL PHOSPHATE 3.12 MILLIGRAM(S): 80 CAPSULE, EXTENDED RELEASE ORAL at 17:58

## 2018-01-14 RX ADMIN — DONEPEZIL HYDROCHLORIDE 10 MILLIGRAM(S): 10 TABLET, FILM COATED ORAL at 21:10

## 2018-01-14 RX ADMIN — HEPARIN SODIUM 800 UNIT(S)/HR: 5000 INJECTION INTRAVENOUS; SUBCUTANEOUS at 08:04

## 2018-01-14 RX ADMIN — HEPARIN SODIUM 800 UNIT(S)/HR: 5000 INJECTION INTRAVENOUS; SUBCUTANEOUS at 18:39

## 2018-01-14 RX ADMIN — WARFARIN SODIUM 2.5 MILLIGRAM(S): 2.5 TABLET ORAL at 21:10

## 2018-01-14 NOTE — PROVIDER CONTACT NOTE (CRITICAL VALUE NOTIFICATION) - BACKGROUND
Pt admitted 1/10/18 with dx. Anemia, HF, PAF, SHANTEL. Has been on Heparin gtt since 1/12/17 for hx. MVR.

## 2018-01-14 NOTE — PROGRESS NOTE ADULT - ASSESSMENT
89 F Hx MVR (tissue/porcine) x 2 (1992, 1998), chronic CHF (unclear systolic/diastolic), Afib on coumadin, mild dementia p/w progressive ESCALERA a/w suspected acute decompensated CHF, possibly symptomatic anemia and SHANTEL.      Dw cardio and GI. Considering benefits vs risk of AC, Benefits outweights risks. Will start IV Heparin.    Anemia: Hemolytic:    Will hold for colonoscopy.  Coumadin + IV Heparin

## 2018-01-14 NOTE — PROVIDER CONTACT NOTE (CRITICAL VALUE NOTIFICATION) - ACTION/TREATMENT ORDERED:
PA notified and aware. recommended to follow nomogram protocol.
Follow Heparin nomogram, continue to monitor pt closely.
Monitor patient.  Pt. to receive one unit soon.
NP aware, attending to decide on plan.

## 2018-01-14 NOTE — PROGRESS NOTE ADULT - SUBJECTIVE AND OBJECTIVE BOX
Patient seen and examined  no complaints    No Known Allergies    Hospital Medications:   MEDICATIONS  (STANDING):  amiodarone    Tablet 200 milliGRAM(s) Oral daily  carvedilol 3.125 milliGRAM(s) Oral every 12 hours  donepezil 10 milliGRAM(s) Oral at bedtime  folic acid 1 milliGRAM(s) Oral daily  heparin  Infusion.  Unit(s)/Hr (9 mL/Hr) IV Continuous <Continuous>      VITALS:  T(F): 98.1 (18 @ 04:42), Max: 98.1 (18 @ 04:42)  HR: 69 (18 @ 04:42)  BP: 134/78 (18 @ 04:42)  RR: 18 (18 @ 04:42)  SpO2: 99% (18 @ 04:42)  Wt(kg): --     @ 07:01  -   @ 07:00  --------------------------------------------------------  IN: 1116 mL / OUT: 1550 mL / NET: -434 mL    PHYSICAL EXAM:  Constitutional: NAD  HEENT: anicteric sclera, oropharynx clear, MMM  Neck: No JVD  Respiratory:b/l rhonchi  Cardiovascular: S1, S2, RRR  Gastrointestinal: BS+, soft, NT/ND  Extremities: No cyanosis or clubbing. No peripheral edema  Neurological: A/O x 3, no focal deficits  Psychiatric: Normal mood, normal affect    LABS:      133<L>  |  97  |  74<H>  ----------------------------<  107<H>  4.2   |  25  |  2.07<H>    Ca    9.2      2018 09:23    TPro  6.7  /  Alb  3.4  /  TBili  0.9  /  DBili      /  AST  37  /  ALT  13  /  AlkPhos  91      Creatinine Trend: 2.07 <--, 2.11 <--, 2.04 <--, 1.80 <--, 2.20 <--, 2.40 <--, 2.24 <--                        7.7    3.61  )-----------( 134      ( 2018 07:18 )             23.7     Urine Studies:  Urinalysis Basic - ( 2018 15:01 )    Color: Yellow / Appearance: Clear / S.018 / pH:   Gluc:  / Ketone: Negative  / Bili: Negative / Urobili: Negative mg/dL   Blood:  / Protein: Negative mg/dL / Nitrite: Negative   Leuk Esterase: Negative / RBC:  / WBC    Sq Epi:  / Non Sq Epi:  / Bacteria:       Chloride, Random Urine: <20 mmol/L ( @ 16:33)  Sodium, Random Urine: 31 mmol/L ( @ 16:33)  Potassium, Random Urine: 36 mmol/L ( @ 16:33)  Creatinine, Random Urine: 71 mg/dL ( @ 16:22)  Protein/Creatinine Ratio Calculation: 0.1 Ratio ( 16:22)    RADIOLOGY & ADDITIONAL STUDIES:

## 2018-01-14 NOTE — PROGRESS NOTE ADULT - SUBJECTIVE AND OBJECTIVE BOX
Patient is a 89y old  Female who presents with a chief complaint of SOB (10 Colt 2018 07:35)      SUBJECTIVE / OVERNIGHT EVENTS:   Feels better.  Denies CP/SOB/Palpitation/HA.    MEDICATIONS  (STANDING):  amiodarone    Tablet 200 milliGRAM(s) Oral daily  carvedilol 3.125 milliGRAM(s) Oral every 12 hours  donepezil 10 milliGRAM(s) Oral at bedtime  folic acid 1 milliGRAM(s) Oral daily  heparin  Infusion.  Unit(s)/Hr (9 mL/Hr) IV Continuous <Continuous>  warfarin 2.5 milliGRAM(s) Oral once    MEDICATIONS  (PRN):  heparin  Injectable 4000 Unit(s) IV Push every 6 hours PRN For aPTT less than 40  heparin  Injectable 2000 Unit(s) IV Push every 6 hours PRN For aPTT between 40 - 57        CAPILLARY BLOOD GLUCOSE        I&O's Summary    13 Jan 2018 07:01  -  14 Jan 2018 07:00  --------------------------------------------------------  IN: 1116 mL / OUT: 1550 mL / NET: -434 mL    14 Jan 2018 07:01  -  14 Jan 2018 18:42  --------------------------------------------------------  IN: 720 mL / OUT: 850 mL / NET: -130 mL        PHYSICAL EXAM:  GENERAL: NAD, well-developed  HEAD:  Atraumatic, Normocephalic  NECK: Supple, No JVD  CHEST/LUNG: Clear to auscultation bilaterally; No wheezing.  HEART: Regular rate and rhythm; No murmurs, rubs, or gallops  ABDOMEN: Soft, Nontender, Nondistended; Bowel sounds present  EXTREMITIES:   No clubbing, cyanosis, or edema  NEUROLOGY: AAO X 3  SKIN: No rashes    LABS:                        7.7    3.61  )-----------( 134      ( 14 Jan 2018 07:18 )             23.7     01-14    133<L>  |  97  |  74<H>  ----------------------------<  107<H>  4.2   |  25  |  2.07<H>    Ca    9.2      14 Jan 2018 09:23    TPro  6.7  /  Alb  3.4  /  TBili  0.9  /  DBili  x   /  AST  37  /  ALT  13  /  AlkPhos  91  01-14    PT/INR - ( 14 Jan 2018 06:14 )   PT: 13.2 sec;   INR: 1.22 ratio         PTT - ( 14 Jan 2018 18:17 )  PTT:71.0 sec        CAPILLARY BLOOD GLUCOSE                    RADIOLOGY & ADDITIONAL TESTS:    Imaging Personally Reviewed:    Consultant(s) Notes Reviewed:      Care Discussed with Consultants/Other Providers:

## 2018-01-15 LAB
ANION GAP SERPL CALC-SCNC: 14 MMOL/L — SIGNIFICANT CHANGE UP (ref 5–17)
APTT BLD: 96.4 SEC — HIGH (ref 27.5–37.4)
BUN SERPL-MCNC: 69 MG/DL — HIGH (ref 7–23)
CALCIUM SERPL-MCNC: 8.9 MG/DL — SIGNIFICANT CHANGE UP (ref 8.4–10.5)
CHLORIDE SERPL-SCNC: 100 MMOL/L — SIGNIFICANT CHANGE UP (ref 96–108)
CO2 SERPL-SCNC: 23 MMOL/L — SIGNIFICANT CHANGE UP (ref 22–31)
CREAT SERPL-MCNC: 2.07 MG/DL — HIGH (ref 0.5–1.3)
GLUCOSE SERPL-MCNC: 105 MG/DL — HIGH (ref 70–99)
HCT VFR BLD CALC: 23.5 % — LOW (ref 34.5–45)
HGB BLD-MCNC: 7.3 G/DL — LOW (ref 11.5–15.5)
INR BLD: 1.21 RATIO — HIGH (ref 0.88–1.16)
MCHC RBC-ENTMCNC: 30.3 PG — SIGNIFICANT CHANGE UP (ref 27–34)
MCHC RBC-ENTMCNC: 31.1 GM/DL — LOW (ref 32–36)
MCV RBC AUTO: 97.5 FL — SIGNIFICANT CHANGE UP (ref 80–100)
PLATELET # BLD AUTO: 163 K/UL — SIGNIFICANT CHANGE UP (ref 150–400)
POTASSIUM SERPL-MCNC: 4.7 MMOL/L — SIGNIFICANT CHANGE UP (ref 3.5–5.3)
POTASSIUM SERPL-SCNC: 4.7 MMOL/L — SIGNIFICANT CHANGE UP (ref 3.5–5.3)
PROTHROM AB SERPL-ACNC: 13.7 SEC — HIGH (ref 10–13.1)
RBC # BLD: 2.41 M/UL — LOW (ref 3.8–5.2)
RBC # FLD: 20.1 % — HIGH (ref 10.3–14.5)
SODIUM SERPL-SCNC: 137 MMOL/L — SIGNIFICANT CHANGE UP (ref 135–145)
WBC # BLD: 3.25 K/UL — LOW (ref 3.8–10.5)
WBC # FLD AUTO: 3.25 K/UL — LOW (ref 3.8–10.5)

## 2018-01-15 RX ORDER — ACETAMINOPHEN 500 MG
650 TABLET ORAL ONCE
Qty: 0 | Refills: 0 | Status: COMPLETED | OUTPATIENT
Start: 2018-01-15 | End: 2018-01-15

## 2018-01-15 RX ORDER — WARFARIN SODIUM 2.5 MG/1
3 TABLET ORAL ONCE
Qty: 0 | Refills: 0 | Status: COMPLETED | OUTPATIENT
Start: 2018-01-15 | End: 2018-01-15

## 2018-01-15 RX ADMIN — Medication 1 MILLIGRAM(S): at 11:44

## 2018-01-15 RX ADMIN — Medication 650 MILLIGRAM(S): at 21:32

## 2018-01-15 RX ADMIN — DONEPEZIL HYDROCHLORIDE 10 MILLIGRAM(S): 10 TABLET, FILM COATED ORAL at 21:33

## 2018-01-15 RX ADMIN — Medication 650 MILLIGRAM(S): at 22:49

## 2018-01-15 RX ADMIN — CARVEDILOL PHOSPHATE 3.12 MILLIGRAM(S): 80 CAPSULE, EXTENDED RELEASE ORAL at 05:39

## 2018-01-15 RX ADMIN — AMIODARONE HYDROCHLORIDE 200 MILLIGRAM(S): 400 TABLET ORAL at 05:39

## 2018-01-15 RX ADMIN — WARFARIN SODIUM 3 MILLIGRAM(S): 2.5 TABLET ORAL at 21:33

## 2018-01-15 RX ADMIN — HEPARIN SODIUM 800 UNIT(S)/HR: 5000 INJECTION INTRAVENOUS; SUBCUTANEOUS at 00:57

## 2018-01-15 RX ADMIN — CARVEDILOL PHOSPHATE 3.12 MILLIGRAM(S): 80 CAPSULE, EXTENDED RELEASE ORAL at 18:02

## 2018-01-15 NOTE — PROGRESS NOTE ADULT - SUBJECTIVE AND OBJECTIVE BOX
Patient seen and examined  no complaints    No Known Allergies    Hospital Medications:   MEDICATIONS  (STANDING):  amiodarone    Tablet 200 milliGRAM(s) Oral daily  carvedilol 3.125 milliGRAM(s) Oral every 12 hours  donepezil 10 milliGRAM(s) Oral at bedtime  folic acid 1 milliGRAM(s) Oral daily  heparin  Infusion.  Unit(s)/Hr (9 mL/Hr) IV Continuous <Continuous>      VITALS:  T(F): 98 (01-15-18 @ 04:33), Max: 98 (18 @ 20:28)  HR: 71 (01-15-18 @ 05:37)  BP: 120/61 (01-15-18 @ 05:37)  RR: 18 (01-15-18 @ 04:33)  SpO2: 99% (01-15-18 @ 04:33)  Wt(kg): --     @ 07:01  -  01-15 @ 07:00  --------------------------------------------------------  IN: 1020 mL / OUT: 1350 mL / NET: -330 mL    01-15 @ 07:01  -  01-15 @ 11:28  --------------------------------------------------------  IN: 240 mL / OUT: 0 mL / NET: 240 mL      PHYSICAL EXAM:  Constitutional: NAD  HEENT: anicteric sclera, oropharynx clear, MMM  Neck: No JVD  Respiratory:b/l rhonchi  Cardiovascular: S1, S2, RRR  Gastrointestinal: BS+, soft, NT/ND  Extremities: No cyanosis or clubbing. No peripheral edema  Neurological: A/O x 3, no focal deficits  Psychiatric: Normal mood, normal affect    LABS:  01-15    137  |  100  |  69<H>  ----------------------------<  105<H>  4.7   |  23  |  2.07<H>    Ca    8.9      15 Colt 2018 07:31    TPro  6.7  /  Alb  3.4  /  TBili  0.9  /  DBili      /  AST  37  /  ALT  13  /  AlkPhos  91  14    Creatinine Trend: 2.07 <--, 2.07 <--, 2.11 <--, 2.04 <--, 1.80 <--, 2.20 <--, 2.40 <--, 2.24 <--                        7.3    3.25  )-----------( 163      ( 15 Colt 2018 07:31 )             23.5     Urine Studies:  Urinalysis Basic - ( 2018 15:01 )    Color: Yellow / Appearance: Clear / S.018 / pH:   Gluc:  / Ketone: Negative  / Bili: Negative / Urobili: Negative mg/dL   Blood:  / Protein: Negative mg/dL / Nitrite: Negative   Leuk Esterase: Negative / RBC:  / WBC    Sq Epi:  / Non Sq Epi:  / Bacteria:       Chloride, Random Urine: <20 mmol/L ( @ 16:33)  Sodium, Random Urine: 31 mmol/L ( @ 16:33)  Potassium, Random Urine: 36 mmol/L ( @ 16:33)  Creatinine, Random Urine: 71 mg/dL ( @ 16:22)  Protein/Creatinine Ratio Calculation: 0.1 Ratio ( @ 16:22)  Osmolality, Random Urine: 396 mos/kg ( @ 15:01)    RADIOLOGY & ADDITIONAL STUDIES:

## 2018-01-15 NOTE — PROGRESS NOTE ADULT - SUBJECTIVE AND OBJECTIVE BOX
Patient is a 89y old  Female who presents with a chief complaint of SOB (10 Colt 2018 07:35)      SUBJECTIVE / OVERNIGHT EVENTS:   Feels better.  Denies CP/SOB/Palpitation/HA.    MEDICATIONS  (STANDING):  amiodarone    Tablet 200 milliGRAM(s) Oral daily  carvedilol 3.125 milliGRAM(s) Oral every 12 hours  donepezil 10 milliGRAM(s) Oral at bedtime  folic acid 1 milliGRAM(s) Oral daily  heparin  Infusion.  Unit(s)/Hr (9 mL/Hr) IV Continuous <Continuous>    MEDICATIONS  (PRN):  heparin  Injectable 4000 Unit(s) IV Push every 6 hours PRN For aPTT less than 40  heparin  Injectable 2000 Unit(s) IV Push every 6 hours PRN For aPTT between 40 - 57        CAPILLARY BLOOD GLUCOSE        I&O's Summary    14 Jan 2018 07:01  -  15 Colt 2018 07:00  --------------------------------------------------------  IN: 1020 mL / OUT: 1350 mL / NET: -330 mL    15 Colt 2018 07:01  -  15 Colt 2018 15:42  --------------------------------------------------------  IN: 480 mL / OUT: 0 mL / NET: 480 mL        PHYSICAL EXAM:  GENERAL: NAD, well-developed  HEAD:  Atraumatic, Normocephalic  NECK: Supple, No JVD  CHEST/LUNG: Clear to auscultation bilaterally; No wheezing.  HEART: Regular rate and rhythm; No murmurs, rubs, or gallops  ABDOMEN: Soft, Nontender, Nondistended; Bowel sounds present  EXTREMITIES:   No clubbing, cyanosis, or edema  NEUROLOGY: AAO X 3  SKIN: No rashes    LABS:                        7.3    3.25  )-----------( 163      ( 15 Colt 2018 07:31 )             23.5     01-15    137  |  100  |  69<H>  ----------------------------<  105<H>  4.7   |  23  |  2.07<H>    Ca    8.9      15 Colt 2018 07:31    TPro  6.7  /  Alb  3.4  /  TBili  0.9  /  DBili  x   /  AST  37  /  ALT  13  /  AlkPhos  91  01-14    PT/INR - ( 15 Colt 2018 07:31 )   PT: 13.7 sec;   INR: 1.21 ratio         PTT - ( 15 Colt 2018 00:26 )  PTT:96.4 sec        CAPILLARY BLOOD GLUCOSE                    RADIOLOGY & ADDITIONAL TESTS:    Imaging Personally Reviewed:    Consultant(s) Notes Reviewed:      Care Discussed with Consultants/Other Providers:

## 2018-01-15 NOTE — PROGRESS NOTE ADULT - SUBJECTIVE AND OBJECTIVE BOX
INTERVAL HPI/OVERNIGHT EVENTS:    no new events  awaiting echo    MEDICATIONS  (STANDING):  amiodarone    Tablet 200 milliGRAM(s) Oral daily  carvedilol 3.125 milliGRAM(s) Oral every 12 hours  donepezil 10 milliGRAM(s) Oral at bedtime  folic acid 1 milliGRAM(s) Oral daily  heparin  Infusion.  Unit(s)/Hr (9 mL/Hr) IV Continuous <Continuous>    MEDICATIONS  (PRN):  acetaminophen   Tablet. 650 milliGRAM(s) Oral once PRN Moderate Pain (4 - 6)  heparin  Injectable 4000 Unit(s) IV Push every 6 hours PRN For aPTT less than 40  heparin  Injectable 2000 Unit(s) IV Push every 6 hours PRN For aPTT between 40 - 57      Allergies    No Known Allergies    Intolerances        Review of Systems:    General:  No wt loss, fevers, chills, night sweats,fatigue,   Eyes:  Good vision, no reported pain  ENT:  No sore throat, pain, runny nose, dysphagia  CV:  No pain, palpitatioins, hypo/hypertension  Resp:  No dyspnea, cough, tachypnea, wheezing  GI:  No pain, No nausea, No vomiting, No diarrhea, No constipatiion, No weight loss, No fever, No pruritis, No rectal bleeding, No tarry stools, No dysphagia,  :  No pain, bleeding, incontinence, nocturia  Muscle:  No pain, weakness  Neuro:  No weakness, tingling, memory problems  Psych:  No fatigue, insomnia, mood problems, depression  Endocrine:  No polyuria, polydypsia, cold/heat intolerance  Heme:  No petechiae, ecchymosis, easy bruisability  Skin:  No rash, tattoos, scars, edema      Vital Signs Last 24 Hrs  T(C): 36.3 (2018 11:39), Max: 37.1 (2018 20:05)  T(F): 97.3 (2018 11:39), Max: 98.7 (2018 20:05)  HR: 70 (2018 11:39) (69 - 71)  BP: 124/67 (2018 11:39) (112/50 - 145/70)  BP(mean): --  RR: 18 (2018 11:39) (17 - 18)  SpO2: 97% (2018 11:39) (95% - 97%)    PHYSICAL EXAM:    Constitutional: NAD, well-developed  HEENT: EOMI, throat clear  Neck: No LAD, supple  Respiratory: CTA and P  Cardiovascular: S1 and S2, RRR, no M  Gastrointestinal: BS+, soft, NT/ND, neg HSM,  Extremities: No peripheral edema, neg clubing, cyanosis  Vascular: 2+ peripheral pulses  Neurological: A/O x 3, no focal deficits  Psychiatric: Normal mood, normal affect  Skin: No rashes      LABS:                        8.1    5.06  )-----------( 188      ( 2018 08:37 )             26.9     -    137  |  98  |  67<H>  ----------------------------<  109<H>  4.7   |  24  |  2.11<H>    Ca    9.0      2018 08:55    TPro  6.8  /  Alb  3.5  /  TBili  1.2  /  DBili  x   /  AST  39  /  ALT  13  /  AlkPhos  87  -    PT/INR - ( 2018 08:37 )   PT: 14.6 sec;   INR: 1.29 ratio         PTT - ( 2018 10:51 )  PTT:84.4 sec  Urinalysis Basic - ( 2018 15:01 )    Color: Yellow / Appearance: Clear / S.018 / pH: x  Gluc: x / Ketone: Negative  / Bili: Negative / Urobili: Negative mg/dL   Blood: x / Protein: Negative mg/dL / Nitrite: Negative   Leuk Esterase: Negative / RBC: x / WBC x   Sq Epi: x / Non Sq Epi: x / Bacteria: x        RADIOLOGY & ADDITIONAL TESTS:

## 2018-01-16 LAB
ANION GAP SERPL CALC-SCNC: 13 MMOL/L — SIGNIFICANT CHANGE UP (ref 5–17)
APTT BLD: 60.8 SEC — HIGH (ref 27.5–37.4)
BUN SERPL-MCNC: 79 MG/DL — HIGH (ref 7–23)
CALCIUM SERPL-MCNC: 8.8 MG/DL — SIGNIFICANT CHANGE UP (ref 8.4–10.5)
CHLORIDE SERPL-SCNC: 98 MMOL/L — SIGNIFICANT CHANGE UP (ref 96–108)
CO2 SERPL-SCNC: 26 MMOL/L — SIGNIFICANT CHANGE UP (ref 22–31)
CREAT SERPL-MCNC: 2.36 MG/DL — HIGH (ref 0.5–1.3)
GLUCOSE SERPL-MCNC: 109 MG/DL — HIGH (ref 70–99)
HCT VFR BLD CALC: 23.9 % — LOW (ref 34.5–45)
HGB BLD-MCNC: 7.9 G/DL — LOW (ref 11.5–15.5)
INR BLD: 1.23 RATIO — HIGH (ref 0.88–1.16)
MCHC RBC-ENTMCNC: 32.9 PG — SIGNIFICANT CHANGE UP (ref 27–34)
MCHC RBC-ENTMCNC: 33 GM/DL — SIGNIFICANT CHANGE UP (ref 32–36)
MCV RBC AUTO: 99.8 FL — SIGNIFICANT CHANGE UP (ref 80–100)
PLATELET # BLD AUTO: 102 K/UL — LOW (ref 150–400)
POTASSIUM SERPL-MCNC: 4.6 MMOL/L — SIGNIFICANT CHANGE UP (ref 3.5–5.3)
POTASSIUM SERPL-SCNC: 4.6 MMOL/L — SIGNIFICANT CHANGE UP (ref 3.5–5.3)
PROTHROM AB SERPL-ACNC: 14 SEC — HIGH (ref 10–13.1)
RBC # BLD: 2.39 M/UL — LOW (ref 3.8–5.2)
RBC # FLD: 16.8 % — HIGH (ref 10.3–14.5)
SODIUM SERPL-SCNC: 137 MMOL/L — SIGNIFICANT CHANGE UP (ref 135–145)
WBC # BLD: 3.8 K/UL — SIGNIFICANT CHANGE UP (ref 3.8–10.5)
WBC # FLD AUTO: 3.8 K/UL — SIGNIFICANT CHANGE UP (ref 3.8–10.5)

## 2018-01-16 PROCEDURE — 99233 SBSQ HOSP IP/OBS HIGH 50: CPT

## 2018-01-16 RX ORDER — WARFARIN SODIUM 2.5 MG/1
4 TABLET ORAL ONCE
Qty: 0 | Refills: 0 | Status: COMPLETED | OUTPATIENT
Start: 2018-01-16 | End: 2018-01-16

## 2018-01-16 RX ADMIN — DONEPEZIL HYDROCHLORIDE 10 MILLIGRAM(S): 10 TABLET, FILM COATED ORAL at 21:54

## 2018-01-16 RX ADMIN — Medication 1 MILLIGRAM(S): at 12:16

## 2018-01-16 RX ADMIN — CARVEDILOL PHOSPHATE 3.12 MILLIGRAM(S): 80 CAPSULE, EXTENDED RELEASE ORAL at 17:06

## 2018-01-16 RX ADMIN — WARFARIN SODIUM 4 MILLIGRAM(S): 2.5 TABLET ORAL at 21:54

## 2018-01-16 RX ADMIN — HEPARIN SODIUM 800 UNIT(S)/HR: 5000 INJECTION INTRAVENOUS; SUBCUTANEOUS at 08:53

## 2018-01-16 RX ADMIN — AMIODARONE HYDROCHLORIDE 200 MILLIGRAM(S): 400 TABLET ORAL at 05:27

## 2018-01-16 RX ADMIN — CARVEDILOL PHOSPHATE 3.12 MILLIGRAM(S): 80 CAPSULE, EXTENDED RELEASE ORAL at 05:27

## 2018-01-16 NOTE — PROGRESS NOTE ADULT - SUBJECTIVE AND OBJECTIVE BOX
INTERVAL HPI/OVERNIGHT EVENTS:    no new events    MEDICATIONS  (STANDING):  amiodarone    Tablet 200 milliGRAM(s) Oral daily  carvedilol 3.125 milliGRAM(s) Oral every 12 hours  donepezil 10 milliGRAM(s) Oral at bedtime  folic acid 1 milliGRAM(s) Oral daily  heparin  Infusion.  Unit(s)/Hr (9 mL/Hr) IV Continuous <Continuous>    MEDICATIONS  (PRN):  acetaminophen   Tablet. 650 milliGRAM(s) Oral once PRN Moderate Pain (4 - 6)  heparin  Injectable 4000 Unit(s) IV Push every 6 hours PRN For aPTT less than 40  heparin  Injectable 2000 Unit(s) IV Push every 6 hours PRN For aPTT between 40 - 57      Allergies    No Known Allergies    Intolerances        Review of Systems:    General:  No wt loss, fevers, chills, night sweats,fatigue,   Eyes:  Good vision, no reported pain  ENT:  No sore throat, pain, runny nose, dysphagia  CV:  No pain, palpitatioins, hypo/hypertension  Resp:  No dyspnea, cough, tachypnea, wheezing  GI:  No pain, No nausea, No vomiting, No diarrhea, No constipatiion, No weight loss, No fever, No pruritis, No rectal bleeding, No tarry stools, No dysphagia,  :  No pain, bleeding, incontinence, nocturia  Muscle:  No pain, weakness  Neuro:  No weakness, tingling, memory problems  Psych:  No fatigue, insomnia, mood problems, depression  Endocrine:  No polyuria, polydypsia, cold/heat intolerance  Heme:  No petechiae, ecchymosis, easy bruisability  Skin:  No rash, tattoos, scars, edema      Vital Signs Last 24 Hrs  T(C): 36.3 (2018 11:39), Max: 37.1 (2018 20:05)  T(F): 97.3 (2018 11:39), Max: 98.7 (2018 20:05)  HR: 70 (2018 11:39) (69 - 71)  BP: 124/67 (2018 11:39) (112/50 - 145/70)  BP(mean): --  RR: 18 (2018 11:39) (17 - 18)  SpO2: 97% (2018 11:39) (95% - 97%)    PHYSICAL EXAM:    Constitutional: NAD, well-developed  HEENT: EOMI, throat clear  Neck: No LAD, supple  Respiratory: CTA and P  Cardiovascular: S1 and S2, RRR, no M  Gastrointestinal: BS+, soft, NT/ND, neg HSM,  Extremities: No peripheral edema, neg clubing, cyanosis  Vascular: 2+ peripheral pulses  Neurological: A/O x 3, no focal deficits  Psychiatric: Normal mood, normal affect  Skin: No rashes      LABS:                        8.1    5.06  )-----------( 188      ( 2018 08:37 )             26.9         137  |  98  |  67<H>  ----------------------------<  109<H>  4.7   |  24  |  2.11<H>    Ca    9.0      2018 08:55    TPro  6.8  /  Alb  3.5  /  TBili  1.2  /  DBili  x   /  AST  39  /  ALT  13  /  AlkPhos  87  -    PT/INR - ( 2018 08:37 )   PT: 14.6 sec;   INR: 1.29 ratio         PTT - ( 2018 10:51 )  PTT:84.4 sec  Urinalysis Basic - ( 2018 15:01 )    Color: Yellow / Appearance: Clear / S.018 / pH: x  Gluc: x / Ketone: Negative  / Bili: Negative / Urobili: Negative mg/dL   Blood: x / Protein: Negative mg/dL / Nitrite: Negative   Leuk Esterase: Negative / RBC: x / WBC x   Sq Epi: x / Non Sq Epi: x / Bacteria: x        RADIOLOGY & ADDITIONAL TESTS:

## 2018-01-16 NOTE — PROGRESS NOTE ADULT - SUBJECTIVE AND OBJECTIVE BOX
Patien seen and examined  no complaints    No Known Allergies    Hospital Medications:   MEDICATIONS  (STANDING):  amiodarone    Tablet 200 milliGRAM(s) Oral daily  carvedilol 3.125 milliGRAM(s) Oral every 12 hours  donepezil 10 milliGRAM(s) Oral at bedtime  folic acid 1 milliGRAM(s) Oral daily      VITALS:  T(F): 97.6 (18 @ 11:48), Max: 97.7 (01-15-18 @ 19:49)  HR: 72 (18 @ 11:48)  BP: 118/71 (18 @ 11:48)  RR: 18 (18 @ 11:48)  SpO2: 100% (18 @ 11:48)  Wt(kg): --    01-15 @ 07: @ 07:00  --------------------------------------------------------  IN: 816 mL / OUT: 0 mL / NET: 816 mL     @ 07: @ 14:25  --------------------------------------------------------  IN: 480 mL / OUT: 0 mL / NET: 480 mL    PHYSICAL EXAM:  Constitutional: NAD  HEENT: anicteric sclera, oropharynx clear, MMM  Neck: No JVD  Respiratory:b/l rhonchi  Cardiovascular: S1, S2, RRR  Gastrointestinal: BS+, soft, NT/ND  Extremities: No cyanosis or clubbing. No peripheral edema  Neurological: A/O x 3, no focal deficits  Psychiatric: Normal mood, normal affect    LABS:      137  |  98  |  79<H>  ----------------------------<  109<H>  4.6   |  26  |  2.36<H>    Ca    8.8      2018 08:03      Creatinine Trend: 2.36 <--, 2.07 <--, 2.07 <--, 2.11 <--, 2.04 <--, 1.80 <--, 2.20 <--, 2.40 <--, 2.24 <--                        7.9    3.8   )-----------( 102      ( 2018 06:17 )             23.9     Urine Studies:  Urinalysis Basic - ( 2018 15:01 )    Color: Yellow / Appearance: Clear / S.018 / pH:   Gluc:  / Ketone: Negative  / Bili: Negative / Urobili: Negative mg/dL   Blood:  / Protein: Negative mg/dL / Nitrite: Negative   Leuk Esterase: Negative / RBC:  / WBC    Sq Epi:  / Non Sq Epi:  / Bacteria:       Chloride, Random Urine: <20 mmol/L ( @ 16:33)  Sodium, Random Urine: 31 mmol/L ( @ 16:33)  Potassium, Random Urine: 36 mmol/L ( @ 16:33)  Creatinine, Random Urine: 71 mg/dL ( @ 16:22)  Protein/Creatinine Ratio Calculation: 0.1 Ratio ( @ 16:22)  Osmolality, Random Urine: 396 mos/kg ( @ 15:01)    RADIOLOGY & ADDITIONAL STUDIES:

## 2018-01-16 NOTE — PROGRESS NOTE ADULT - ASSESSMENT
89 F with likely multifactorial dyspnea with MVR, HF with severe LV dysfunction  ·	Echo reviewed and helps clarify picture.   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 Continue to monitor.   ·	SOB is likely related to both chronic anemia and chronic systolic HF.   ·	Holding diuretics for now. Strict I and O. Monitor Crt. Resume when crt improves. If crt does not improve may imply pre-renal state due to low cardiac output.   ·	Continue coreg at present dose for now.  ·	No ACE-I given SHANTEL. May consider imdur/ hydralazine.   ·	Overall prognosis is poor.

## 2018-01-16 NOTE — CHART NOTE - NSCHARTNOTEFT_GEN_A_CORE
NP episode note  - nose bleed  pt seen and examined on the chair. pt c/o nose bleed saturated 1 pcs of tissue. denies dizziness, HA, chest pain, palpitation, SOB. V/S stable. pt is on heparin gtt bridging to coumadin. ptt/inr 60.8/1.23. H/H 7.9/23.9. d/w Dr. Anna and d/serena hep gtt even though inr is not therapeutic. will continue to monitor tele and H/H, Ptt/ INR.  Darnell Arteaga NP  #[id number]

## 2018-01-16 NOTE — PROGRESS NOTE ADULT - SUBJECTIVE AND OBJECTIVE BOX
CC:    Interval History:     MEDICATIONS:  amiodarone    Tablet 200 milliGRAM(s) Oral daily  carvedilol 3.125 milliGRAM(s) Oral every 12 hours  donepezil 10 milliGRAM(s) Oral at bedtime  folic acid 1 milliGRAM(s) Oral daily      LABS:  01-16    137  |  98  |  79<H>  ----------------------------<  109<H>  4.6   |  26  |  2.36<H>    Ca    8.8      16 Jan 2018 08:03                            7.9    3.8   )-----------( 102      ( 16 Jan 2018 06:17 )             23.9     PT/INR - ( 16 Jan 2018 07:45 )   PT: 14.0 sec;   INR: 1.23 ratio         PTT - ( 16 Jan 2018 07:45 )  PTT:60.8 sec          VITAL SIGNS:   T(C): 36.4 (01-16-18 @ 11:48), Max: 36.5 (01-15-18 @ 19:49)  HR: 72 (01-16-18 @ 11:48) (69 - 72)  BP: 118/71 (01-16-18 @ 11:48) (118/71 - 131/71)  RR: 18 (01-16-18 @ 11:48) (18 - 18)  SpO2: 100% (01-16-18 @ 11:48) (99% - 100%)  Daily     Daily   I&O's Summary    15 Colt 2018 07:01  -  16 Jan 2018 07:00  --------------------------------------------------------  IN: 816 mL / OUT: 0 mL / NET: 816 mL    16 Jan 2018 07:01  -  16 Jan 2018 13:26  --------------------------------------------------------  IN: 480 mL / OUT: 0 mL / NET: 480 mL        TELE: CC: SOB    Interval History: Ms. Pulido echo noted. She has anemia which is chronic and likely related to [facility] MVR. She has systolic HF which is decompensated. She has PAF mostly in NSR on amio. She has [facility] MVR with nose bleed on hep gtt.     MEDICATIONS:  amiodarone    Tablet 200 milliGRAM(s) Oral daily  carvedilol 3.125 milliGRAM(s) Oral every 12 hours  donepezil 10 milliGRAM(s) Oral at bedtime  folic acid 1 milliGRAM(s) Oral daily      LABS:  01-16    137  |  98  |  79<H>  ----------------------------<  109<H>  4.6   |  26  |  2.36<H>    Ca    8.8      16 Jan 2018 08:03                            7.9    3.8   )-----------( 102      ( 16 Jan 2018 06:17 )             23.9     PT/INR - ( 16 Jan 2018 07:45 )   PT: 14.0 sec;   INR: 1.23 ratio         PTT - ( 16 Jan 2018 07:45 )  PTT:60.8 sec      VITAL SIGNS:   T(C): 36.4 (01-16-18 @ 11:48), Max: 36.5 (01-15-18 @ 19:49)  HR: 72 (01-16-18 @ 11:48) (69 - 72)  BP: 118/71 (01-16-18 @ 11:48) (118/71 - 131/71)  RR: 18 (01-16-18 @ 11:48) (18 - 18)  SpO2: 100% (01-16-18 @ 11:48) (99% - 100%)  Daily     Daily   I&O's Summary    15 Colt 2018 07:01  -  16 Jan 2018 07:00  --------------------------------------------------------  IN: 816 mL / OUT: 0 mL / NET: 816 mL    16 Jan 2018 07:01  -  16 Jan 2018 13:26  --------------------------------------------------------  IN: 480 mL / OUT: 0 mL / NET: 480 mL        TELE: No significant ectopy

## 2018-01-16 NOTE — PROGRESS NOTE ADULT - SUBJECTIVE AND OBJECTIVE BOX
Patient is a 89y old  Female who presents with a chief complaint of SOB (10 Colt 2018 07:35)      SUBJECTIVE / OVERNIGHT EVENTS:   Feels better.  Denies CP/SOB/Palpitation/HA.    MEDICATIONS  (STANDING):  amiodarone    Tablet 200 milliGRAM(s) Oral daily  carvedilol 3.125 milliGRAM(s) Oral every 12 hours  donepezil 10 milliGRAM(s) Oral at bedtime  folic acid 1 milliGRAM(s) Oral daily  warfarin 4 milliGRAM(s) Oral once    MEDICATIONS  (PRN):        CAPILLARY BLOOD GLUCOSE        I&O's Summary    15 Colt 2018 07:01  -  16 Jan 2018 07:00  --------------------------------------------------------  IN: 816 mL / OUT: 0 mL / NET: 816 mL    16 Jan 2018 07:01  -  16 Jan 2018 21:28  --------------------------------------------------------  IN: 720 mL / OUT: 0 mL / NET: 720 mL        PHYSICAL EXAM:  GENERAL: NAD, well-developed  HEAD:  Atraumatic, Normocephalic  NECK: Supple, No JVD  CHEST/LUNG: Clear to auscultation bilaterally; No wheezing.  HEART: Regular rate and rhythm; No murmurs, rubs, or gallops  ABDOMEN: Soft, Nontender, Nondistended; Bowel sounds present  EXTREMITIES:   No clubbing, cyanosis, or edema  NEUROLOGY: AAO X 3  SKIN: No rashes    LABS:                        7.9    3.8   )-----------( 102      ( 16 Jan 2018 06:17 )             23.9     01-16    137  |  98  |  79<H>  ----------------------------<  109<H>  4.6   |  26  |  2.36<H>    Ca    8.8      16 Jan 2018 08:03      PT/INR - ( 16 Jan 2018 07:45 )   PT: 14.0 sec;   INR: 1.23 ratio         PTT - ( 16 Jan 2018 07:45 )  PTT:60.8 sec        CAPILLARY BLOOD GLUCOSE                    RADIOLOGY & ADDITIONAL TESTS:    Imaging Personally Reviewed:    Consultant(s) Notes Reviewed:      Care Discussed with Consultants/Other Providers:

## 2018-01-16 NOTE — PROGRESS NOTE ADULT - ASSESSMENT
89 F Hx MVR (tissue/porcine) x 2 (1992, 1998), chronic CHF (unclear systolic/diastolic), Afib on coumadin, mild dementia p/w progressive ESCALERA a/w suspected acute decompensated CHF, possibly symptomatic anemia and SHANTEL.      Dw cardio and GI. Considering benefits vs risk of AC, Benefits outweights risks. Will start IV Heparin.    Anemia: Hemolytic:     colonoscopy on thursday.  Hold Coumadin & IV Heparin

## 2018-01-17 LAB
ANION GAP SERPL CALC-SCNC: 13 MMOL/L — SIGNIFICANT CHANGE UP (ref 5–17)
APTT BLD: 38.4 SEC — HIGH (ref 27.5–37.4)
BUN SERPL-MCNC: 81 MG/DL — HIGH (ref 7–23)
CALCIUM SERPL-MCNC: 8.7 MG/DL — SIGNIFICANT CHANGE UP (ref 8.4–10.5)
CHLORIDE SERPL-SCNC: 99 MMOL/L — SIGNIFICANT CHANGE UP (ref 96–108)
CO2 SERPL-SCNC: 25 MMOL/L — SIGNIFICANT CHANGE UP (ref 22–31)
CREAT SERPL-MCNC: 2.57 MG/DL — HIGH (ref 0.5–1.3)
GLUCOSE SERPL-MCNC: 105 MG/DL — HIGH (ref 70–99)
HCT VFR BLD CALC: 25 % — LOW (ref 34.5–45)
HGB BLD-MCNC: 7.7 G/DL — LOW (ref 11.5–15.5)
INR BLD: 1.31 RATIO — HIGH (ref 0.88–1.16)
MCHC RBC-ENTMCNC: 29.6 PG — SIGNIFICANT CHANGE UP (ref 27–34)
MCHC RBC-ENTMCNC: 30.8 GM/DL — LOW (ref 32–36)
MCV RBC AUTO: 96.2 FL — SIGNIFICANT CHANGE UP (ref 80–100)
PLATELET # BLD AUTO: 175 K/UL — SIGNIFICANT CHANGE UP (ref 150–400)
POTASSIUM SERPL-MCNC: 4.6 MMOL/L — SIGNIFICANT CHANGE UP (ref 3.5–5.3)
POTASSIUM SERPL-SCNC: 4.6 MMOL/L — SIGNIFICANT CHANGE UP (ref 3.5–5.3)
PROTHROM AB SERPL-ACNC: 14.9 SEC — HIGH (ref 10–13.1)
RBC # BLD: 2.6 M/UL — LOW (ref 3.8–5.2)
RBC # FLD: 19 % — HIGH (ref 10.3–14.5)
SODIUM SERPL-SCNC: 137 MMOL/L — SIGNIFICANT CHANGE UP (ref 135–145)
WBC # BLD: 4.26 K/UL — SIGNIFICANT CHANGE UP (ref 3.8–10.5)
WBC # FLD AUTO: 4.26 K/UL — SIGNIFICANT CHANGE UP (ref 3.8–10.5)

## 2018-01-17 PROCEDURE — 99232 SBSQ HOSP IP/OBS MODERATE 35: CPT

## 2018-01-17 RX ORDER — SOD SULF/SODIUM/NAHCO3/KCL/PEG
1 SOLUTION, RECONSTITUTED, ORAL ORAL EVERY 4 HOURS
Qty: 0 | Refills: 0 | Status: COMPLETED | OUTPATIENT
Start: 2018-01-17 | End: 2018-01-17

## 2018-01-17 RX ORDER — HEPARIN SODIUM 5000 [USP'U]/ML
INJECTION INTRAVENOUS; SUBCUTANEOUS
Qty: 25000 | Refills: 0 | Status: DISCONTINUED | OUTPATIENT
Start: 2018-01-17 | End: 2018-01-18

## 2018-01-17 RX ORDER — HEPARIN SODIUM 5000 [USP'U]/ML
4000 INJECTION INTRAVENOUS; SUBCUTANEOUS EVERY 6 HOURS
Qty: 0 | Refills: 0 | Status: DISCONTINUED | OUTPATIENT
Start: 2018-01-17 | End: 2018-01-18

## 2018-01-17 RX ORDER — FUROSEMIDE 40 MG
20 TABLET ORAL ONCE
Qty: 0 | Refills: 0 | Status: COMPLETED | OUTPATIENT
Start: 2018-01-17 | End: 2018-01-17

## 2018-01-17 RX ORDER — HEPARIN SODIUM 5000 [USP'U]/ML
2000 INJECTION INTRAVENOUS; SUBCUTANEOUS EVERY 6 HOURS
Qty: 0 | Refills: 0 | Status: DISCONTINUED | OUTPATIENT
Start: 2018-01-17 | End: 2018-01-18

## 2018-01-17 RX ADMIN — CARVEDILOL PHOSPHATE 3.12 MILLIGRAM(S): 80 CAPSULE, EXTENDED RELEASE ORAL at 05:32

## 2018-01-17 RX ADMIN — Medication 1 MILLIGRAM(S): at 11:45

## 2018-01-17 RX ADMIN — DONEPEZIL HYDROCHLORIDE 10 MILLIGRAM(S): 10 TABLET, FILM COATED ORAL at 22:49

## 2018-01-17 RX ADMIN — Medication 20 MILLIGRAM(S): at 17:17

## 2018-01-17 RX ADMIN — Medication 10 MILLIGRAM(S): at 15:11

## 2018-01-17 RX ADMIN — HEPARIN SODIUM 900 UNIT(S)/HR: 5000 INJECTION INTRAVENOUS; SUBCUTANEOUS at 18:31

## 2018-01-17 RX ADMIN — CARVEDILOL PHOSPHATE 3.12 MILLIGRAM(S): 80 CAPSULE, EXTENDED RELEASE ORAL at 17:09

## 2018-01-17 RX ADMIN — Medication 1 LITER(S): at 17:09

## 2018-01-17 RX ADMIN — Medication 1 LITER(S): at 22:48

## 2018-01-17 RX ADMIN — Medication 10 MILLIGRAM(S): at 17:09

## 2018-01-17 RX ADMIN — AMIODARONE HYDROCHLORIDE 200 MILLIGRAM(S): 400 TABLET ORAL at 05:32

## 2018-01-17 NOTE — PROGRESS NOTE ADULT - SUBJECTIVE AND OBJECTIVE BOX
CC:    Interval History:     MEDICATIONS:  amiodarone    Tablet 200 milliGRAM(s) Oral daily  bisacodyl 10 milliGRAM(s) Oral every 4 hours  carvedilol 3.125 milliGRAM(s) Oral every 12 hours  donepezil 10 milliGRAM(s) Oral at bedtime  folic acid 1 milliGRAM(s) Oral daily  furosemide   Injectable 20 milliGRAM(s) IV Push once  polyethylene glycol/electrolyte Solution 1 Liter(s) Oral every 4 hours      LABS:  01-17    137  |  99  |  81<H>  ----------------------------<  105<H>  4.6   |  25  |  2.57<H>    Ca    8.7      17 Jan 2018 07:33                            7.7    4.26  )-----------( 175      ( 17 Jan 2018 07:37 )             25.0     PT/INR - ( 17 Jan 2018 07:37 )   PT: 14.9 sec;   INR: 1.31 ratio         PTT - ( 17 Jan 2018 07:37 )  PTT:38.4 sec          VITAL SIGNS:   T(C): 36.6 (01-17-18 @ 12:15), Max: 36.6 (01-16-18 @ 20:08)  HR: 70 (01-17-18 @ 12:15) (69 - 70)  BP: 119/64 (01-17-18 @ 12:15) (119/64 - 150/75)  RR: 18 (01-17-18 @ 12:15) (18 - 18)  SpO2: 95% (01-17-18 @ 12:15) (95% - 100%)  Daily     Daily   I&O's Summary    16 Jan 2018 07:01  -  17 Jan 2018 07:00  --------------------------------------------------------  IN: 840 mL / OUT: 0 mL / NET: 840 mL    17 Jan 2018 07:01  -  17 Jan 2018 17:00  --------------------------------------------------------  IN: 585 mL / OUT: 0 mL / NET: 585 mL        TELE: CC: HF, anemia    Interval History: No significant change. Received lasix X T.     MEDICATIONS:  amiodarone    Tablet 200 milliGRAM(s) Oral daily  bisacodyl 10 milliGRAM(s) Oral every 4 hours  carvedilol 3.125 milliGRAM(s) Oral every 12 hours  donepezil 10 milliGRAM(s) Oral at bedtime  folic acid 1 milliGRAM(s) Oral daily  furosemide   Injectable 20 milliGRAM(s) IV Push once  polyethylene glycol/electrolyte Solution 1 Liter(s) Oral every 4 hours      LABS:  01-17    137  |  99  |  81<H>  ----------------------------<  105<H>  4.6   |  25  |  2.57<H>    Ca    8.7      17 Jan 2018 07:33                            7.7    4.26  )-----------( 175      ( 17 Jan 2018 07:37 )             25.0     PT/INR - ( 17 Jan 2018 07:37 )   PT: 14.9 sec;   INR: 1.31 ratio         PTT - ( 17 Jan 2018 07:37 )  PTT:38.4 sec          VITAL SIGNS:   T(C): 36.6 (01-17-18 @ 12:15), Max: 36.6 (01-16-18 @ 20:08)  HR: 70 (01-17-18 @ 12:15) (69 - 70)  BP: 119/64 (01-17-18 @ 12:15) (119/64 - 150/75)  RR: 18 (01-17-18 @ 12:15) (18 - 18)  SpO2: 95% (01-17-18 @ 12:15) (95% - 100%)  Daily     Daily   I&O's Summary    16 Jan 2018 07:01  -  17 Jan 2018 07:00  --------------------------------------------------------  IN: 840 mL / OUT: 0 mL / NET: 840 mL    17 Jan 2018 07:01  -  17 Jan 2018 17:00  --------------------------------------------------------  IN: 585 mL / OUT: 0 mL / NET: 585 mL        TELE: No significant ectopy

## 2018-01-17 NOTE — PROGRESS NOTE ADULT - ASSESSMENT
89 F with likely multifactorial dyspnea with MVR, HF with severe LV dysfunction  ·	Agree with lasix as need to keep O>I  ·	Given mech MVR with AF will need to be on A/C. Will resume. Can hold for colonoscopy if planned.   ·	Continue other cardiac medications for now.   ·	D/w Wilfrido.

## 2018-01-17 NOTE — PROGRESS NOTE ADULT - SUBJECTIVE AND OBJECTIVE BOX
Patient is a 89y old  Female who presents with a chief complaint of SOB (10 Colt 2018 07:35)      SUBJECTIVE / OVERNIGHT EVENTS:  Mild SOB. not in distress.  Denies CP/Palpitation/HA.    MEDICATIONS  (STANDING):  amiodarone    Tablet 200 milliGRAM(s) Oral daily  bisacodyl 10 milliGRAM(s) Oral every 4 hours  carvedilol 3.125 milliGRAM(s) Oral every 12 hours  donepezil 10 milliGRAM(s) Oral at bedtime  folic acid 1 milliGRAM(s) Oral daily  furosemide   Injectable 20 milliGRAM(s) IV Push once  polyethylene glycol/electrolyte Solution 1 Liter(s) Oral every 4 hours    MEDICATIONS  (PRN):        CAPILLARY BLOOD GLUCOSE        I&O's Summary    16 Jan 2018 07:01  -  17 Jan 2018 07:00  --------------------------------------------------------  IN: 840 mL / OUT: 0 mL / NET: 840 mL    17 Jan 2018 07:01  -  17 Jan 2018 16:12  --------------------------------------------------------  IN: 585 mL / OUT: 0 mL / NET: 585 mL        PHYSICAL EXAM:  GENERAL: NAD, well-developed  HEAD:  Atraumatic, Normocephalic  NECK: Supple, No JVD  CHEST/LUNG: Clear to auscultation bilaterally; No wheezing.  HEART: Regular rate and rhythm; No murmurs, rubs, or gallops  ABDOMEN: Soft, Nontender, Nondistended; Bowel sounds present  EXTREMITIES:   No clubbing, cyanosis, or edema  NEUROLOGY: AAO X 3  SKIN: No rashes    LABS:                        7.7    4.26  )-----------( 175      ( 17 Jan 2018 07:37 )             25.0     01-17    137  |  99  |  81<H>  ----------------------------<  105<H>  4.6   |  25  |  2.57<H>    Ca    8.7      17 Jan 2018 07:33      PT/INR - ( 17 Jan 2018 07:37 )   PT: 14.9 sec;   INR: 1.31 ratio         PTT - ( 17 Jan 2018 07:37 )  PTT:38.4 sec        CAPILLARY BLOOD GLUCOSE                    RADIOLOGY & ADDITIONAL TESTS:    Imaging Personally Reviewed:    Consultant(s) Notes Reviewed:      Care Discussed with Consultants/Other Providers:

## 2018-01-17 NOTE — PROGRESS NOTE ADULT - SUBJECTIVE AND OBJECTIVE BOX
Patient seen and examined  no complaints    No Known Allergies    Hospital Medications:   MEDICATIONS  (STANDING):  amiodarone    Tablet 200 milliGRAM(s) Oral daily  carvedilol 3.125 milliGRAM(s) Oral every 12 hours  donepezil 10 milliGRAM(s) Oral at bedtime  folic acid 1 milliGRAM(s) Oral daily        VITALS:  T(F): 97.4 (18 @ 04:26), Max: 97.8 (18 @ 20:08)  HR: 69 (18 @ 04:26)  BP: 150/75 (18 @ 04:26)  RR: 18 (18 @ 04:26)  SpO2: 100% (18 @ 04:26)  Wt(kg): --     @ 07:  -   @ 07:00  --------------------------------------------------------  IN: 840 mL / OUT: 0 mL / NET: 840 mL     @ 07:  -   @ 10:13  --------------------------------------------------------  IN: 295 mL / OUT: 0 mL / NET: 295 mL        PHYSICAL EXAM:  Constitutional: NAD  HEENT: anicteric sclera, oropharynx clear, MMM  Neck: No JVD  Respiratory: CTAB, no wheezes, rales or rhonchi  Cardiovascular: S1, S2, RRR  Gastrointestinal: BS+, soft, NT/ND  Extremities:2+ peripheral edema  Neurological: A/O x 3, no focal deficits      LABS:      137  |  99  |  81<H>  ----------------------------<  105<H>  4.6   |  25  |  2.57<H>    Ca    8.7      2018 07:33      Creatinine Trend: 2.57 <--, 2.36 <--, 2.07 <--, 2.07 <--, 2.11 <--, 2.04 <--, 1.80 <--, 2.20 <--                        7.7    4.26  )-----------( 175      ( 2018 07:37 )             25.0     Urine Studies:  Urinalysis Basic - ( 2018 15:01 )    Color: Yellow / Appearance: Clear / S.018 / pH:   Gluc:  / Ketone: Negative  / Bili: Negative / Urobili: Negative mg/dL   Blood:  / Protein: Negative mg/dL / Nitrite: Negative   Leuk Esterase: Negative / RBC:  / WBC    Sq Epi:  / Non Sq Epi:  / Bacteria:       Chloride, Random Urine: <20 mmol/L ( @ 16:33)  Sodium, Random Urine: 31 mmol/L ( @ 16:33)  Potassium, Random Urine: 36 mmol/L ( @ 16:33)  Creatinine, Random Urine: 71 mg/dL ( @ 16:22)  Protein/Creatinine Ratio Calculation: 0.1 Ratio ( @ 16:22)  Osmolality, Random Urine: 396 mos/kg ( @ 15:01)    RADIOLOGY & ADDITIONAL STUDIES:

## 2018-01-18 LAB
ANION GAP SERPL CALC-SCNC: 17 MMOL/L — SIGNIFICANT CHANGE UP (ref 5–17)
APPEARANCE UR: CLEAR — SIGNIFICANT CHANGE UP
APTT BLD: 108.1 SEC — HIGH (ref 27.5–37.4)
BACTERIA # UR AUTO: ABNORMAL
BILIRUB UR-MCNC: NEGATIVE — SIGNIFICANT CHANGE UP
BUN SERPL-MCNC: 77 MG/DL — HIGH (ref 7–23)
CALCIUM SERPL-MCNC: 8.8 MG/DL — SIGNIFICANT CHANGE UP (ref 8.4–10.5)
CHLORIDE SERPL-SCNC: 100 MMOL/L — SIGNIFICANT CHANGE UP (ref 96–108)
CHLORIDE UR-SCNC: <20 MMOL/L — SIGNIFICANT CHANGE UP
CO2 SERPL-SCNC: 23 MMOL/L — SIGNIFICANT CHANGE UP (ref 22–31)
COLOR SPEC: YELLOW — SIGNIFICANT CHANGE UP
CREAT ?TM UR-MCNC: 90 MG/DL — SIGNIFICANT CHANGE UP
CREAT SERPL-MCNC: 2.33 MG/DL — HIGH (ref 0.5–1.3)
DIFF PNL FLD: NEGATIVE — SIGNIFICANT CHANGE UP
EPI CELLS # UR: 5 /HPF — SIGNIFICANT CHANGE UP (ref 0–5)
GLUCOSE SERPL-MCNC: 125 MG/DL — HIGH (ref 70–99)
GLUCOSE UR QL: NEGATIVE MG/DL — SIGNIFICANT CHANGE UP
HCT VFR BLD CALC: 24.2 % — LOW (ref 34.5–45)
HCT VFR BLD CALC: 26.2 % — LOW (ref 34.5–45)
HGB BLD-MCNC: 7.7 G/DL — LOW (ref 11.5–15.5)
HGB BLD-MCNC: 8.5 G/DL — LOW (ref 11.5–15.5)
HYALINE CASTS # UR AUTO: 0 /LPF — SIGNIFICANT CHANGE UP (ref 0–7)
INR BLD: 1.47 RATIO — HIGH (ref 0.88–1.16)
KETONES UR-MCNC: NEGATIVE — SIGNIFICANT CHANGE UP
LEUKOCYTE ESTERASE UR-ACNC: ABNORMAL
MCHC RBC-ENTMCNC: 31 PG — SIGNIFICANT CHANGE UP (ref 27–34)
MCHC RBC-ENTMCNC: 31.8 GM/DL — LOW (ref 32–36)
MCHC RBC-ENTMCNC: 32.5 GM/DL — SIGNIFICANT CHANGE UP (ref 32–36)
MCHC RBC-ENTMCNC: 32.8 PG — SIGNIFICANT CHANGE UP (ref 27–34)
MCV RBC AUTO: 101 FL — HIGH (ref 80–100)
MCV RBC AUTO: 97.6 FL — SIGNIFICANT CHANGE UP (ref 80–100)
NITRITE UR-MCNC: NEGATIVE — SIGNIFICANT CHANGE UP
OSMOLALITY UR: 394 MOS/KG — SIGNIFICANT CHANGE UP (ref 50–1200)
PH UR: 5.5 — SIGNIFICANT CHANGE UP (ref 5–8)
PLATELET # BLD AUTO: 132 K/UL — LOW (ref 150–400)
PLATELET # BLD AUTO: 145 K/UL — LOW (ref 150–400)
POTASSIUM SERPL-MCNC: 4.6 MMOL/L — SIGNIFICANT CHANGE UP (ref 3.5–5.3)
POTASSIUM SERPL-SCNC: 4.6 MMOL/L — SIGNIFICANT CHANGE UP (ref 3.5–5.3)
POTASSIUM UR-SCNC: 46 MMOL/L — SIGNIFICANT CHANGE UP
PROT ?TM UR-MCNC: 21 MG/DL — HIGH (ref 0–12)
PROT UR-MCNC: ABNORMAL MG/DL
PROT/CREAT UR-RTO: 0.2 RATIO — SIGNIFICANT CHANGE UP (ref 0–0.2)
PROTHROM AB SERPL-ACNC: 16.8 SEC — HIGH (ref 10–13.1)
RBC # BLD: 2.48 M/UL — LOW (ref 3.8–5.2)
RBC # BLD: 2.59 M/UL — LOW (ref 3.8–5.2)
RBC # FLD: 16.8 % — HIGH (ref 10.3–14.5)
RBC # FLD: 18.9 % — HIGH (ref 10.3–14.5)
RBC CASTS # UR COMP ASSIST: 0 /HPF — SIGNIFICANT CHANGE UP (ref 0–4)
SODIUM SERPL-SCNC: 140 MMOL/L — SIGNIFICANT CHANGE UP (ref 135–145)
SODIUM UR-SCNC: 20 MMOL/L — SIGNIFICANT CHANGE UP
SP GR SPEC: 1.02 — SIGNIFICANT CHANGE UP (ref 1.01–1.02)
UROBILINOGEN FLD QL: NEGATIVE MG/DL — SIGNIFICANT CHANGE UP
UUN UR-MCNC: 662 MG/DL — SIGNIFICANT CHANGE UP
WBC # BLD: 4.33 K/UL — SIGNIFICANT CHANGE UP (ref 3.8–10.5)
WBC # BLD: 5.6 K/UL — SIGNIFICANT CHANGE UP (ref 3.8–10.5)
WBC # FLD AUTO: 4.33 K/UL — SIGNIFICANT CHANGE UP (ref 3.8–10.5)
WBC # FLD AUTO: 5.6 K/UL — SIGNIFICANT CHANGE UP (ref 3.8–10.5)
WBC UR QL: 3 /HPF — SIGNIFICANT CHANGE UP (ref 0–5)

## 2018-01-18 RX ORDER — WARFARIN SODIUM 2.5 MG/1
4 TABLET ORAL ONCE
Qty: 0 | Refills: 0 | Status: COMPLETED | OUTPATIENT
Start: 2018-01-18 | End: 2018-01-18

## 2018-01-18 RX ORDER — HEPARIN SODIUM 5000 [USP'U]/ML
4000 INJECTION INTRAVENOUS; SUBCUTANEOUS EVERY 6 HOURS
Qty: 0 | Refills: 0 | Status: DISCONTINUED | OUTPATIENT
Start: 2018-01-18 | End: 2018-01-19

## 2018-01-18 RX ORDER — HEPARIN SODIUM 5000 [USP'U]/ML
2000 INJECTION INTRAVENOUS; SUBCUTANEOUS EVERY 6 HOURS
Qty: 0 | Refills: 0 | Status: DISCONTINUED | OUTPATIENT
Start: 2018-01-18 | End: 2018-01-19

## 2018-01-18 RX ORDER — HEPARIN SODIUM 5000 [USP'U]/ML
INJECTION INTRAVENOUS; SUBCUTANEOUS
Qty: 25000 | Refills: 0 | Status: DISCONTINUED | OUTPATIENT
Start: 2018-01-18 | End: 2018-01-19

## 2018-01-18 RX ADMIN — HEPARIN SODIUM 900 UNIT(S)/HR: 5000 INJECTION INTRAVENOUS; SUBCUTANEOUS at 15:44

## 2018-01-18 RX ADMIN — CARVEDILOL PHOSPHATE 3.12 MILLIGRAM(S): 80 CAPSULE, EXTENDED RELEASE ORAL at 05:27

## 2018-01-18 RX ADMIN — WARFARIN SODIUM 4 MILLIGRAM(S): 2.5 TABLET ORAL at 21:25

## 2018-01-18 RX ADMIN — AMIODARONE HYDROCHLORIDE 200 MILLIGRAM(S): 400 TABLET ORAL at 05:27

## 2018-01-18 RX ADMIN — DONEPEZIL HYDROCHLORIDE 10 MILLIGRAM(S): 10 TABLET, FILM COATED ORAL at 21:25

## 2018-01-18 RX ADMIN — CARVEDILOL PHOSPHATE 3.12 MILLIGRAM(S): 80 CAPSULE, EXTENDED RELEASE ORAL at 17:25

## 2018-01-18 NOTE — PROCEDURE NOTE - ADDITIONAL PROCEDURE DETAILS
Indication: post- GI procedure   - presenting rhythm: V-paced rhythm at 70 bpm  - measured data WNL, normal BiV ICD function   - Pt is not PM dependent   - BiV pacing: > 99%   - changes made: none     # KARINA Frye [facility] # [id number]
Indication: post- GI procedure   - presenting rhythm: V-paced rhythm at rate 70 bpm  - measured data: WNL, normal BiV ICD function.   - pt is not PM dependent   - BiV pacin%  - changes made: none     KARINA Frye [facility] # [id number]
Indication: post-GI procedure   - presenting rhythm: V-paced rhythm at 70 bpm  - measured data WNL, normal BiV ICD function   - pt is not PM dependent   - BiV pacing: > 99%  - changes made: none     # [id number]

## 2018-01-18 NOTE — PROGRESS NOTE ADULT - ASSESSMENT
89 F Hx MVR (tissue/porcine) x 2 (1992, 1998), chronic CHF (unclear systolic/diastolic), Afib on coumadin, mild dementia p/w progressive ESCALERA a/w suspected acute decompensated CHF, possibly symptomatic anemia and SHANTEL.      Dw cardio and GI. Considering benefits vs risk of AC, Benefits outweights risks. Will start IV Heparin.    Anemia: Hemolytic:    S/p  colonoscopy .   IV Heparin

## 2018-01-18 NOTE — PROGRESS NOTE ADULT - SUBJECTIVE AND OBJECTIVE BOX
Patient seen and examined  no complaints      No Known Allergies    Hospital Medications:   MEDICATIONS  (STANDING):  amiodarone    Tablet 200 milliGRAM(s) Oral daily  carvedilol 3.125 milliGRAM(s) Oral every 12 hours  donepezil 10 milliGRAM(s) Oral at bedtime  folic acid 1 milliGRAM(s) Oral daily  heparin  Infusion.  Unit(s)/Hr (9 mL/Hr) IV Continuous <Continuous>      VITALS:  T(F): 97.6 (18 @ 04:39), Max: 97.7 (18 @ 20:09)  HR: 70 (18 @ 04:39)  BP: 127/54 (18 @ 04:39)  RR: 18 (18 @ 04:39)  SpO2: 99% (18 @ 04:39)  Wt(kg): --     @ 07:01  -   @ 07:00  --------------------------------------------------------  IN: 1425 mL / OUT: 0 mL / NET: 1425 mL      PHYSICAL EXAM:  Constitutional: NAD  HEENT: anicteric sclera, oropharynx clear, MMM  Neck: No JVD  Respiratory: CTAB, no wheezes, rales or rhonchi  Cardiovascular: S1, S2, RRR  Gastrointestinal: BS+, soft, NT/ND  Extremities:2+ peripheral edema  Neurological: A/O x 3, no focal deficits    LABS:      140  |  100  |  77<H>  ----------------------------<  125<H>  4.6   |  23  |  2.33<H>    Ca    8.8      2018 07:56      Creatinine Trend: 2.33 <--, 2.57 <--, 2.36 <--, 2.07 <--, 2.07 <--, 2.11 <--, 2.04 <--, 1.80 <--                        7.7    4.33  )-----------( 145      ( 2018 06:35 )             24.2     Urine Studies:  Urinalysis Basic - ( 2018 06:35 )    Color: Yellow / Appearance: Clear / S.021 / pH:   Gluc:  / Ketone: Negative  / Bili: Negative / Urobili: Negative mg/dL   Blood:  / Protein: Trace mg/dL / Nitrite: Negative   Leuk Esterase: Moderate / RBC: 0 /HPF / WBC 3 /HPF   Sq Epi:  / Non Sq Epi: 5 /HPF / Bacteria: Occasional      Creatinine, Random Urine: 90 mg/dL ( @ 07:56)  Protein/Creatinine Ratio Calculation: 0.2 Ratio ( @ 07:56)  Chloride, Random Urine: <20 mmol/L ( @ 07:56)  Potassium, Random Urine: 46 mmol/L ( @ 07:56)  Sodium, Random Urine: 20 mmol/L ( @ 07:56)  Chloride, Random Urine: <20 mmol/L ( @ 16:33)  Sodium, Random Urine: 31 mmol/L ( @ 16:33)  Potassium, Random Urine: 36 mmol/L ( @ 16:33)  Creatinine, Random Urine: 71 mg/dL ( @ 16:22)  Protein/Creatinine Ratio Calculation: 0.1 Ratio ( @ 16:22)  Osmolality, Random Urine: 396 mos/kg ( @ 15:01)    RADIOLOGY & ADDITIONAL STUDIES:

## 2018-01-18 NOTE — PROGRESS NOTE ADULT - SUBJECTIVE AND OBJECTIVE BOX
Patient is a 89y old  Female who presents with a chief complaint of SOB (10 Colt 2018 07:35)      SUBJECTIVE / OVERNIGHT EVENTS:   Feels better.  Denies CP/SOB/Palpitation/HA.    MEDICATIONS  (STANDING):  amiodarone    Tablet 200 milliGRAM(s) Oral daily  carvedilol 3.125 milliGRAM(s) Oral every 12 hours  donepezil 10 milliGRAM(s) Oral at bedtime  folic acid 1 milliGRAM(s) Oral daily  heparin  Infusion.  Unit(s)/Hr (9 mL/Hr) IV Continuous <Continuous>    MEDICATIONS  (PRN):  heparin  Injectable 4000 Unit(s) IV Push every 6 hours PRN For aPTT less than 40  heparin  Injectable 2000 Unit(s) IV Push every 6 hours PRN For aPTT between 40 - 57        CAPILLARY BLOOD GLUCOSE        I&O's Summary    2018 07:  -  2018 07:00  --------------------------------------------------------  IN: 1425 mL / OUT: 0 mL / NET: 1425 mL    2018 07:  -  2018 22:54  --------------------------------------------------------  IN: 600 mL / OUT: 0 mL / NET: 600 mL        PHYSICAL EXAM:  GENERAL: NAD, well-developed  HEAD:  Atraumatic, Normocephalic  NECK: Supple, No JVD  CHEST/LUNG: Clear to auscultation bilaterally; No wheezing.  HEART: Regular rate and rhythm; No murmurs, rubs, or gallops  ABDOMEN: Soft, Nontender, Nondistended; Bowel sounds present  EXTREMITIES:   No clubbing, cyanosis, or edema  NEUROLOGY: AAO X 3  SKIN: No rashes    LABS:                        8.5    5.6   )-----------( 132      ( 2018 21:54 )             26.2     -    140  |  100  |  77<H>  ----------------------------<  125<H>  4.6   |  23  |  2.33<H>    Ca    8.8      2018 07:56      PT/INR - ( 2018 06:35 )   PT: 16.8 sec;   INR: 1.47 ratio         PTT - ( 2018 07:37 )  PTT:38.4 sec      Urinalysis Basic - ( 2018 06:35 )    Color: Yellow / Appearance: Clear / S.021 / pH: x  Gluc: x / Ketone: Negative  / Bili: Negative / Urobili: Negative mg/dL   Blood: x / Protein: Trace mg/dL / Nitrite: Negative   Leuk Esterase: Moderate / RBC: 0 /HPF / WBC 3 /HPF   Sq Epi: x / Non Sq Epi: 5 /HPF / Bacteria: Occasional      CAPILLARY BLOOD GLUCOSE                    RADIOLOGY & ADDITIONAL TESTS:    Imaging Personally Reviewed:    Consultant(s) Notes Reviewed:      Care Discussed with Consultants/Other Providers:

## 2018-01-19 LAB
ANION GAP SERPL CALC-SCNC: 14 MMOL/L — SIGNIFICANT CHANGE UP (ref 5–17)
APTT BLD: 120.8 SEC — CRITICAL HIGH (ref 27.5–37.4)
APTT BLD: 82.8 SEC — HIGH (ref 27.5–37.4)
BUN SERPL-MCNC: 69 MG/DL — HIGH (ref 7–23)
CALCIUM SERPL-MCNC: 8.9 MG/DL — SIGNIFICANT CHANGE UP (ref 8.4–10.5)
CHLORIDE SERPL-SCNC: 101 MMOL/L — SIGNIFICANT CHANGE UP (ref 96–108)
CO2 SERPL-SCNC: 23 MMOL/L — SIGNIFICANT CHANGE UP (ref 22–31)
CREAT SERPL-MCNC: 2.33 MG/DL — HIGH (ref 0.5–1.3)
GLUCOSE SERPL-MCNC: 121 MG/DL — HIGH (ref 70–99)
HCT VFR BLD CALC: 25.1 % — LOW (ref 34.5–45)
HGB BLD-MCNC: 7.9 G/DL — LOW (ref 11.5–15.5)
INR BLD: 2.06 RATIO — HIGH (ref 0.88–1.16)
MCHC RBC-ENTMCNC: 30.9 PG — SIGNIFICANT CHANGE UP (ref 27–34)
MCHC RBC-ENTMCNC: 31.5 GM/DL — LOW (ref 32–36)
MCV RBC AUTO: 98 FL — SIGNIFICANT CHANGE UP (ref 80–100)
PLATELET # BLD AUTO: 175 K/UL — SIGNIFICANT CHANGE UP (ref 150–400)
POTASSIUM SERPL-MCNC: 4.3 MMOL/L — SIGNIFICANT CHANGE UP (ref 3.5–5.3)
POTASSIUM SERPL-SCNC: 4.3 MMOL/L — SIGNIFICANT CHANGE UP (ref 3.5–5.3)
PROTHROM AB SERPL-ACNC: 22.8 SEC — HIGH (ref 9.8–12.7)
RBC # BLD: 2.56 M/UL — LOW (ref 3.8–5.2)
RBC # FLD: 19 % — HIGH (ref 10.3–14.5)
SODIUM SERPL-SCNC: 138 MMOL/L — SIGNIFICANT CHANGE UP (ref 135–145)
WBC # BLD: 4.19 K/UL — SIGNIFICANT CHANGE UP (ref 3.8–10.5)
WBC # FLD AUTO: 4.19 K/UL — SIGNIFICANT CHANGE UP (ref 3.8–10.5)

## 2018-01-19 PROCEDURE — 99232 SBSQ HOSP IP/OBS MODERATE 35: CPT

## 2018-01-19 RX ORDER — WARFARIN SODIUM 2.5 MG/1
4 TABLET ORAL ONCE
Qty: 0 | Refills: 0 | Status: COMPLETED | OUTPATIENT
Start: 2018-01-19 | End: 2018-01-19

## 2018-01-19 RX ADMIN — Medication 1 MILLIGRAM(S): at 16:57

## 2018-01-19 RX ADMIN — DONEPEZIL HYDROCHLORIDE 10 MILLIGRAM(S): 10 TABLET, FILM COATED ORAL at 22:16

## 2018-01-19 RX ADMIN — HEPARIN SODIUM 800 UNIT(S)/HR: 5000 INJECTION INTRAVENOUS; SUBCUTANEOUS at 00:08

## 2018-01-19 RX ADMIN — AMIODARONE HYDROCHLORIDE 200 MILLIGRAM(S): 400 TABLET ORAL at 05:25

## 2018-01-19 RX ADMIN — WARFARIN SODIUM 4 MILLIGRAM(S): 2.5 TABLET ORAL at 22:16

## 2018-01-19 RX ADMIN — HEPARIN SODIUM 700 UNIT(S)/HR: 5000 INJECTION INTRAVENOUS; SUBCUTANEOUS at 07:38

## 2018-01-19 RX ADMIN — CARVEDILOL PHOSPHATE 3.12 MILLIGRAM(S): 80 CAPSULE, EXTENDED RELEASE ORAL at 16:57

## 2018-01-19 RX ADMIN — CARVEDILOL PHOSPHATE 3.12 MILLIGRAM(S): 80 CAPSULE, EXTENDED RELEASE ORAL at 05:25

## 2018-01-19 NOTE — PROGRESS NOTE ADULT - RESPIRATORY COMMENTS
Decreased R>L base. Nl effort.
Decreased BS b/l bases. Nl effort.
decreased BS at base. NL effort.
Decreased breath sounds at the base. Nl effort.

## 2018-01-19 NOTE — PROGRESS NOTE ADULT - SUBJECTIVE AND OBJECTIVE BOX
Patient is a 89y old  Female who presents with a chief complaint of SOB (10 Colt 2018 07:35)      SUBJECTIVE / OVERNIGHT EVENTS:   Feels better.  Denies CP/SOB/Palpitation/HA.    MEDICATIONS  (STANDING):  amiodarone    Tablet 200 milliGRAM(s) Oral daily  carvedilol 3.125 milliGRAM(s) Oral every 12 hours  donepezil 10 milliGRAM(s) Oral at bedtime  folic acid 1 milliGRAM(s) Oral daily  warfarin 4 milliGRAM(s) Oral once    MEDICATIONS  (PRN):        CAPILLARY BLOOD GLUCOSE        I&O's Summary    2018 07:  -  2018 07:00  --------------------------------------------------------  IN: 600 mL / OUT: 0 mL / NET: 600 mL    2018 07:  -  2018 15:13  --------------------------------------------------------  IN: 720 mL / OUT: 150 mL / NET: 570 mL        PHYSICAL EXAM:  GENERAL: NAD, well-developed  HEAD:  Atraumatic, Normocephalic  NECK: Supple, No JVD  CHEST/LUNG: Clear to auscultation bilaterally; No wheezing.  HEART: Regular rate and rhythm; No murmurs, rubs, or gallops  ABDOMEN: Soft, Nontender, Nondistended; Bowel sounds present  EXTREMITIES:   No clubbing, cyanosis, or edema  NEUROLOGY: AAO X 3  SKIN: No rashes    LABS:                        7.9    4.19  )-----------( 175      ( 2018 07:38 )             25.1         138  |  101  |  69<H>  ----------------------------<  121<H>  4.3   |  23  |  2.33<H>    Ca    8.9      2018 07:41      PT/INR - ( 2018 06:32 )   PT: 22.8 sec;   INR: 2.06 ratio         PTT - ( 2018 14:32 )  PTT:82.8 sec      Urinalysis Basic - ( 2018 06:35 )    Color: Yellow / Appearance: Clear / S.021 / pH: x  Gluc: x / Ketone: Negative  / Bili: Negative / Urobili: Negative mg/dL   Blood: x / Protein: Trace mg/dL / Nitrite: Negative   Leuk Esterase: Moderate / RBC: 0 /HPF / WBC 3 /HPF   Sq Epi: x / Non Sq Epi: 5 /HPF / Bacteria: Occasional      CAPILLARY BLOOD GLUCOSE                    RADIOLOGY & ADDITIONAL TESTS:    Imaging Personally Reviewed:    Consultant(s) Notes Reviewed:      Care Discussed with Consultants/Other Providers:

## 2018-01-19 NOTE — PROGRESS NOTE ADULT - CONSTITUTIONAL
Well-developed, well nourished
detailed exam

## 2018-01-19 NOTE — PROGRESS NOTE ADULT - SUBJECTIVE AND OBJECTIVE BOX
INTERVAL HPI/OVERNIGHT EVENTS:    no new events  s/p colonoscopy     MEDICATIONS  (STANDING):  amiodarone    Tablet 200 milliGRAM(s) Oral daily  carvedilol 3.125 milliGRAM(s) Oral every 12 hours  donepezil 10 milliGRAM(s) Oral at bedtime  folic acid 1 milliGRAM(s) Oral daily  heparin  Infusion.  Unit(s)/Hr (9 mL/Hr) IV Continuous <Continuous>    MEDICATIONS  (PRN):  acetaminophen   Tablet. 650 milliGRAM(s) Oral once PRN Moderate Pain (4 - 6)  heparin  Injectable 4000 Unit(s) IV Push every 6 hours PRN For aPTT less than 40  heparin  Injectable 2000 Unit(s) IV Push every 6 hours PRN For aPTT between 40 - 57      Allergies    No Known Allergies    Intolerances        Review of Systems:    General:  No wt loss, fevers, chills, night sweats,fatigue,   Eyes:  Good vision, no reported pain  ENT:  No sore throat, pain, runny nose, dysphagia  CV:  No pain, palpitatioins, hypo/hypertension  Resp:  No dyspnea, cough, tachypnea, wheezing  GI:  No pain, No nausea, No vomiting, No diarrhea, No constipatiion, No weight loss, No fever, No pruritis, No rectal bleeding, No tarry stools, No dysphagia,  :  No pain, bleeding, incontinence, nocturia  Muscle:  No pain, weakness  Neuro:  No weakness, tingling, memory problems  Psych:  No fatigue, insomnia, mood problems, depression  Endocrine:  No polyuria, polydypsia, cold/heat intolerance  Heme:  No petechiae, ecchymosis, easy bruisability  Skin:  No rash, tattoos, scars, edema      Vital Signs Last 24 Hrs  T(C): 36.3 (2018 11:39), Max: 37.1 (2018 20:05)  T(F): 97.3 (2018 11:39), Max: 98.7 (2018 20:05)  HR: 70 (2018 11:39) (69 - 71)  BP: 124/67 (2018 11:39) (112/50 - 145/70)  BP(mean): --  RR: 18 (2018 11:39) (17 - 18)  SpO2: 97% (2018 11:39) (95% - 97%)    PHYSICAL EXAM:    Constitutional: NAD, well-developed  HEENT: EOMI, throat clear  Neck: No LAD, supple  Respiratory: CTA and P  Cardiovascular: S1 and S2, RRR, no M  Gastrointestinal: BS+, soft, NT/ND, neg HSM,  Extremities: No peripheral edema, neg clubing, cyanosis  Vascular: 2+ peripheral pulses  Neurological: A/O x 3, no focal deficits  Psychiatric: Normal mood, normal affect  Skin: No rashes      LABS:                        8.1    5.06  )-----------( 188      ( 2018 08:37 )             26.9     -    137  |  98  |  67<H>  ----------------------------<  109<H>  4.7   |  24  |  2.11<H>    Ca    9.0      2018 08:55    TPro  6.8  /  Alb  3.5  /  TBili  1.2  /  DBili  x   /  AST  39  /  ALT  13  /  AlkPhos  87  -    PT/INR - ( 2018 08:37 )   PT: 14.6 sec;   INR: 1.29 ratio         PTT - ( 2018 10:51 )  PTT:84.4 sec  Urinalysis Basic - ( 2018 15:01 )    Color: Yellow / Appearance: Clear / S.018 / pH: x  Gluc: x / Ketone: Negative  / Bili: Negative / Urobili: Negative mg/dL   Blood: x / Protein: Negative mg/dL / Nitrite: Negative   Leuk Esterase: Negative / RBC: x / WBC x   Sq Epi: x / Non Sq Epi: x / Bacteria: x        RADIOLOGY & ADDITIONAL TESTS:

## 2018-01-19 NOTE — PROGRESS NOTE ADULT - SUBJECTIVE AND OBJECTIVE BOX
Patient seen and examined  no complaints    No Known Allergies    Hospital Medications:   MEDICATIONS  (STANDING):  amiodarone    Tablet 200 milliGRAM(s) Oral daily  carvedilol 3.125 milliGRAM(s) Oral every 12 hours  donepezil 10 milliGRAM(s) Oral at bedtime  folic acid 1 milliGRAM(s) Oral daily  heparin  Infusion.  Unit(s)/Hr (9 mL/Hr) IV Continuous <Continuous>        VITALS:  T(F): 97.8 (18 @ 04:24), Max: 97.8 (18 @ 04:24)  HR: 69 (18 @ 05:24)  BP: 139/82 (18 @ 05:24)  RR: 18 (18 @ 04:24)  SpO2: 96% (18 @ 04:24)  Wt(kg): --     @ 07:  -   @ 07:00  --------------------------------------------------------  IN: 600 mL / OUT: 0 mL / NET: 600 mL     @ 07:  -   @ 13:42  --------------------------------------------------------  IN: 360 mL / OUT: 150 mL / NET: 210 mL        PHYSICAL EXAM:  Constitutional: NAD  HEENT: anicteric sclera, oropharynx clear, MMM  Neck: No JVD  Respiratory: CTAB, no wheezes, rales or rhonchi  Cardiovascular: S1, S2, RRR  Gastrointestinal: BS+, soft, NT/ND  Extremities:2+ peripheral edema  Neurological: A/O x 3, no focal deficits    LABS:      138  |  101  |  69<H>  ----------------------------<  121<H>  4.3   |  23  |  2.33<H>    Ca    8.9      2018 07:41      Creatinine Trend: 2.33 <--, 2.33 <--, 2.57 <--, 2.36 <--, 2.07 <--, 2.07 <--, 2.11 <--                        7.9    4.19  )-----------( 175      ( 2018 07:38 )             25.1     Urine Studies:  Urinalysis Basic - ( 2018 06:35 )    Color: Yellow / Appearance: Clear / S.021 / pH:   Gluc:  / Ketone: Negative  / Bili: Negative / Urobili: Negative mg/dL   Blood:  / Protein: Trace mg/dL / Nitrite: Negative   Leuk Esterase: Moderate / RBC: 0 /HPF / WBC 3 /HPF   Sq Epi:  / Non Sq Epi: 5 /HPF / Bacteria: Occasional      Creatinine, Random Urine: 90 mg/dL ( @ 07:56)  Protein/Creatinine Ratio Calculation: 0.2 Ratio ( @ 07:56)  Chloride, Random Urine: <20 mmol/L ( @ 07:56)  Potassium, Random Urine: 46 mmol/L ( @ 07:56)  Osmolality, Random Urine: 394 mos/kg ( @ 07:56)  Sodium, Random Urine: 20 mmol/L ( @ 07:56)  Chloride, Random Urine: <20 mmol/L ( @ 16:33)  Sodium, Random Urine: 31 mmol/L ( @ 16:33)  Potassium, Random Urine: 36 mmol/L ( @ 16:33)  Creatinine, Random Urine: 71 mg/dL ( @ 16:22)  Protein/Creatinine Ratio Calculation: 0.1 Ratio ( @ 16:22)  Osmolality, Random Urine: 396 mos/kg ( @ 15:01)    RADIOLOGY & ADDITIONAL STUDIES:

## 2018-01-19 NOTE — PROGRESS NOTE ADULT - ASSESSMENT
89 F with likely multifactorial dyspnea with MVR, HF with severe LV dysfunction  ·	Agree with lasix as need to keep O>I  ·	Mech MVR on coumadin. INR over 2. Given AF with mech MVR target 2.5.   ·	Continue other cardiac medications for now.   ·	No significant findings on colonoscopy.   ·	Overall prognosis is poor.

## 2018-01-19 NOTE — PROGRESS NOTE ADULT - SUBJECTIVE AND OBJECTIVE BOX
CC:    Interval History:     MEDICATIONS:  amiodarone    Tablet 200 milliGRAM(s) Oral daily  carvedilol 3.125 milliGRAM(s) Oral every 12 hours  donepezil 10 milliGRAM(s) Oral at bedtime  folic acid 1 milliGRAM(s) Oral daily  warfarin 4 milliGRAM(s) Oral once      LABS:      138  |  101  |  69<H>  ----------------------------<  121<H>  4.3   |  23  |  2.33<H>    Ca    8.9      2018 07:41                            7.9    4.19  )-----------( 175      ( 2018 07:38 )             25.1     PT/INR - ( 2018 06:32 )   PT: 22.8 sec;   INR: 2.06 ratio         PTT - ( 2018 14:32 )  PTT:82.8 sec          VITAL SIGNS:   T(C): 36.3 (18 @ 13:47), Max: 36.6 (18 @ 04:24)  HR: 69 (18 @ 16:58) (69 - 70)  BP: 118/71 (18 @ 16:58) (115/44 - 145/75)  RR: 18 (18 @ 13:47) (18 - 18)  SpO2: 99% (18 @ 13:47) (95% - 99%)  Daily     Daily Weight in k.8 (2018 07:53)  I&O's Summary    2018 07:  -  2018 07:00  --------------------------------------------------------  IN: 600 mL / OUT: 0 mL / NET: 600 mL    2018 07:01  -  2018 19:00  --------------------------------------------------------  IN: 720 mL / OUT: 350 mL / NET: 370 mL        TELE: CC: SOB, HFrEF    Interval History: SOB. S/p colonoscopy    MEDICATIONS:  amiodarone    Tablet 200 milliGRAM(s) Oral daily  carvedilol 3.125 milliGRAM(s) Oral every 12 hours  donepezil 10 milliGRAM(s) Oral at bedtime  folic acid 1 milliGRAM(s) Oral daily  warfarin 4 milliGRAM(s) Oral once      LABS:      138  |  101  |  69<H>  ----------------------------<  121<H>  4.3   |  23  |  2.33<H>    Ca    8.9      2018 07:41                            7.9    4.19  )-----------( 175      ( 2018 07:38 )             25.1     PT/INR - ( 2018 06:32 )   PT: 22.8 sec;   INR: 2.06 ratio         PTT - ( 2018 14:32 )  PTT:82.8 sec          VITAL SIGNS:   T(C): 36.3 (18 @ 13:47), Max: 36.6 (18 @ 04:24)  HR: 69 (18 @ 16:58) (69 - 70)  BP: 118/71 (18 @ 16:58) (115/44 - 145/75)  RR: 18 (18 @ 13:47) (18 - 18)  SpO2: 99% (18 @ 13:47) (95% - 99%)  Daily     Daily Weight in k.8 (2018 07:53)  I&O's Summary    2018 07:  -  2018 07:00  --------------------------------------------------------  IN: 600 mL / OUT: 0 mL / NET: 600 mL    2018 07:01  -  2018 19:00  --------------------------------------------------------  IN: 720 mL / OUT: 350 mL / NET: 370 mL        TELE:

## 2018-01-20 VITALS — WEIGHT: 114.2 LBS

## 2018-01-20 LAB
ANION GAP SERPL CALC-SCNC: 12 MMOL/L — SIGNIFICANT CHANGE UP (ref 5–17)
BUN SERPL-MCNC: 76 MG/DL — HIGH (ref 7–23)
CALCIUM SERPL-MCNC: 8.9 MG/DL — SIGNIFICANT CHANGE UP (ref 8.4–10.5)
CHLORIDE SERPL-SCNC: 101 MMOL/L — SIGNIFICANT CHANGE UP (ref 96–108)
CO2 SERPL-SCNC: 24 MMOL/L — SIGNIFICANT CHANGE UP (ref 22–31)
CREAT SERPL-MCNC: 2.5 MG/DL — HIGH (ref 0.5–1.3)
GLUCOSE SERPL-MCNC: 114 MG/DL — HIGH (ref 70–99)
HCT VFR BLD CALC: 24.8 % — LOW (ref 34.5–45)
HGB BLD-MCNC: 7.5 G/DL — LOW (ref 11.5–15.5)
INR BLD: 2.19 RATIO — HIGH (ref 0.88–1.16)
MCHC RBC-ENTMCNC: 29.9 PG — SIGNIFICANT CHANGE UP (ref 27–34)
MCHC RBC-ENTMCNC: 30.2 GM/DL — LOW (ref 32–36)
MCV RBC AUTO: 98.8 FL — SIGNIFICANT CHANGE UP (ref 80–100)
PLATELET # BLD AUTO: 193 K/UL — SIGNIFICANT CHANGE UP (ref 150–400)
POTASSIUM SERPL-MCNC: 4.3 MMOL/L — SIGNIFICANT CHANGE UP (ref 3.5–5.3)
POTASSIUM SERPL-SCNC: 4.3 MMOL/L — SIGNIFICANT CHANGE UP (ref 3.5–5.3)
PROTHROM AB SERPL-ACNC: 25.1 SEC — HIGH (ref 10–13.1)
RBC # BLD: 2.51 M/UL — LOW (ref 3.8–5.2)
RBC # FLD: 19.3 % — HIGH (ref 10.3–14.5)
SODIUM SERPL-SCNC: 137 MMOL/L — SIGNIFICANT CHANGE UP (ref 135–145)
WBC # BLD: 4.24 K/UL — SIGNIFICANT CHANGE UP (ref 3.8–10.5)
WBC # FLD AUTO: 4.24 K/UL — SIGNIFICANT CHANGE UP (ref 3.8–10.5)

## 2018-01-20 PROCEDURE — 83880 ASSAY OF NATRIURETIC PEPTIDE: CPT

## 2018-01-20 PROCEDURE — 97110 THERAPEUTIC EXERCISES: CPT

## 2018-01-20 PROCEDURE — 82272 OCCULT BLD FECES 1-3 TESTS: CPT

## 2018-01-20 PROCEDURE — 82553 CREATINE MB FRACTION: CPT

## 2018-01-20 PROCEDURE — 93005 ELECTROCARDIOGRAM TRACING: CPT

## 2018-01-20 PROCEDURE — 85610 PROTHROMBIN TIME: CPT

## 2018-01-20 PROCEDURE — 80053 COMPREHEN METABOLIC PANEL: CPT

## 2018-01-20 PROCEDURE — 85014 HEMATOCRIT: CPT

## 2018-01-20 PROCEDURE — 84484 ASSAY OF TROPONIN QUANT: CPT

## 2018-01-20 PROCEDURE — 87798 DETECT AGENT NOS DNA AMP: CPT

## 2018-01-20 PROCEDURE — 86901 BLOOD TYPING SEROLOGIC RH(D): CPT

## 2018-01-20 PROCEDURE — 76775 US EXAM ABDO BACK WALL LIM: CPT

## 2018-01-20 PROCEDURE — 71045 X-RAY EXAM CHEST 1 VIEW: CPT

## 2018-01-20 PROCEDURE — 84132 ASSAY OF SERUM POTASSIUM: CPT

## 2018-01-20 PROCEDURE — 82803 BLOOD GASES ANY COMBINATION: CPT

## 2018-01-20 PROCEDURE — 81003 URINALYSIS AUTO W/O SCOPE: CPT

## 2018-01-20 PROCEDURE — 80162 ASSAY OF DIGOXIN TOTAL: CPT

## 2018-01-20 PROCEDURE — 85730 THROMBOPLASTIN TIME PARTIAL: CPT

## 2018-01-20 PROCEDURE — 83605 ASSAY OF LACTIC ACID: CPT

## 2018-01-20 PROCEDURE — 82436 ASSAY OF URINE CHLORIDE: CPT

## 2018-01-20 PROCEDURE — 84156 ASSAY OF PROTEIN URINE: CPT

## 2018-01-20 PROCEDURE — 36430 TRANSFUSION BLD/BLD COMPNT: CPT

## 2018-01-20 PROCEDURE — 83010 ASSAY OF HAPTOGLOBIN QUANT: CPT

## 2018-01-20 PROCEDURE — 99285 EMERGENCY DEPT VISIT HI MDM: CPT | Mod: 25

## 2018-01-20 PROCEDURE — 97162 PT EVAL MOD COMPLEX 30 MIN: CPT

## 2018-01-20 PROCEDURE — 81001 URINALYSIS AUTO W/SCOPE: CPT

## 2018-01-20 PROCEDURE — 83935 ASSAY OF URINE OSMOLALITY: CPT

## 2018-01-20 PROCEDURE — 84133 ASSAY OF URINE POTASSIUM: CPT

## 2018-01-20 PROCEDURE — 82435 ASSAY OF BLOOD CHLORIDE: CPT

## 2018-01-20 PROCEDURE — 87581 M.PNEUMON DNA AMP PROBE: CPT

## 2018-01-20 PROCEDURE — 96374 THER/PROPH/DIAG INJ IV PUSH: CPT

## 2018-01-20 PROCEDURE — 84300 ASSAY OF URINE SODIUM: CPT

## 2018-01-20 PROCEDURE — 84540 ASSAY OF URINE/UREA-N: CPT

## 2018-01-20 PROCEDURE — 86900 BLOOD TYPING SEROLOGIC ABO: CPT

## 2018-01-20 PROCEDURE — 80048 BASIC METABOLIC PNL TOTAL CA: CPT

## 2018-01-20 PROCEDURE — 82947 ASSAY GLUCOSE BLOOD QUANT: CPT

## 2018-01-20 PROCEDURE — 82330 ASSAY OF CALCIUM: CPT

## 2018-01-20 PROCEDURE — 84295 ASSAY OF SERUM SODIUM: CPT

## 2018-01-20 PROCEDURE — 87633 RESP VIRUS 12-25 TARGETS: CPT

## 2018-01-20 PROCEDURE — 87486 CHLMYD PNEUM DNA AMP PROBE: CPT

## 2018-01-20 PROCEDURE — 82550 ASSAY OF CK (CPK): CPT

## 2018-01-20 PROCEDURE — 83615 LACTATE (LD) (LDH) ENZYME: CPT

## 2018-01-20 PROCEDURE — 99232 SBSQ HOSP IP/OBS MODERATE 35: CPT

## 2018-01-20 PROCEDURE — 93306 TTE W/DOPPLER COMPLETE: CPT

## 2018-01-20 PROCEDURE — 85027 COMPLETE CBC AUTOMATED: CPT

## 2018-01-20 PROCEDURE — 97116 GAIT TRAINING THERAPY: CPT

## 2018-01-20 PROCEDURE — 86923 COMPATIBILITY TEST ELECTRIC: CPT

## 2018-01-20 PROCEDURE — P9011: CPT

## 2018-01-20 PROCEDURE — 86850 RBC ANTIBODY SCREEN: CPT

## 2018-01-20 RX ORDER — CARVEDILOL PHOSPHATE 80 MG/1
1 CAPSULE, EXTENDED RELEASE ORAL
Qty: 0 | Refills: 0 | COMMUNITY
Start: 2018-01-20

## 2018-01-20 RX ORDER — DIGOXIN 250 MCG
0.12 TABLET ORAL
Qty: 0 | Refills: 0 | COMMUNITY

## 2018-01-20 RX ORDER — AMIODARONE HYDROCHLORIDE 400 MG/1
1 TABLET ORAL
Qty: 0 | Refills: 0 | COMMUNITY
Start: 2018-01-20

## 2018-01-20 RX ORDER — AMIODARONE HYDROCHLORIDE 400 MG/1
200 TABLET ORAL
Qty: 0 | Refills: 0 | COMMUNITY

## 2018-01-20 RX ORDER — DONEPEZIL HYDROCHLORIDE 10 MG/1
1 TABLET, FILM COATED ORAL
Qty: 0 | Refills: 0 | COMMUNITY
Start: 2018-01-20

## 2018-01-20 RX ORDER — CARVEDILOL PHOSPHATE 80 MG/1
3.12 CAPSULE, EXTENDED RELEASE ORAL
Qty: 0 | Refills: 0 | COMMUNITY

## 2018-01-20 RX ORDER — WARFARIN SODIUM 2.5 MG/1
4 TABLET ORAL ONCE
Qty: 0 | Refills: 0 | Status: DISCONTINUED | OUTPATIENT
Start: 2018-01-20 | End: 2018-01-20

## 2018-01-20 RX ORDER — DONEPEZIL HYDROCHLORIDE 10 MG/1
10 TABLET, FILM COATED ORAL
Qty: 0 | Refills: 0 | COMMUNITY

## 2018-01-20 RX ORDER — CANDESARTAN CILEXETIL 8 MG/1
4 TABLET ORAL
Qty: 0 | Refills: 0 | COMMUNITY

## 2018-01-20 RX ADMIN — CARVEDILOL PHOSPHATE 3.12 MILLIGRAM(S): 80 CAPSULE, EXTENDED RELEASE ORAL at 05:23

## 2018-01-20 RX ADMIN — AMIODARONE HYDROCHLORIDE 200 MILLIGRAM(S): 400 TABLET ORAL at 05:23

## 2018-01-20 RX ADMIN — CARVEDILOL PHOSPHATE 3.12 MILLIGRAM(S): 80 CAPSULE, EXTENDED RELEASE ORAL at 17:53

## 2018-01-20 RX ADMIN — Medication 1 MILLIGRAM(S): at 17:53

## 2018-01-20 NOTE — PROGRESS NOTE ADULT - ASSESSMENT
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  relatively stable renal function  lytes at goal  volume status  off lasix.  anemia s/p 4 units PRBC.

## 2018-01-20 NOTE — PROGRESS NOTE ADULT - ASSESSMENT
89 F with likely multifactorial dyspnea with MVR, HF with severe LV dysfunction    - wants to go home  - creatinine is up-trending today, await nephrology recommendations  - BP is well controlled today, HR is well controlled:  continue with amio and coreg at current dose  - she has some L sided rales on exam, she is asymptomatic.  Consider re-starting home dose of torsemide if okay with renal  - unclear if she is ready for discharge today. 89 F with likely multifactorial dyspnea with MVR, HF with severe LV dysfunction    - wants to go home  - BP is well controlled today, HR is well controlled:  continue with amio and coreg at current dose  - she has some L sided rales on exam, she is asymptomatic.  Consider re-starting home dose of torsemide if okay with renal  - await nephrology recommendations 89 F with likely multifactorial dyspnea with MVR, HF with severe LV dysfunction    - wants to go home  - BP is well controlled today, HR is well controlled:  continue with amio and coreg at current dose  - she has some L sided rales on exam, she is asymptomatic.  Consider re-starting home dose of torsemide if okay with renal  - await nephrology recommendations    [place]  [id number]

## 2018-01-20 NOTE — PROGRESS NOTE ADULT - PROVIDER SPECIALTY LIST ADULT
Cardiology
Gastroenterology
Internal Medicine
Nephrology
Gastroenterology
Cardiology

## 2018-01-20 NOTE — DISCHARGE NOTE ADULT - CARE PLAN
Principal Discharge DX:	Anemia, unspecified type  Goal:	Patient remains hemodynamically stable.  Assessment and plan of treatment:	s/p colonscopy.   Follow up with your PMD within 3 days of discharge.  Secondary Diagnosis:	Paroxysmal a-fib  Goal:	Follow up with your PMD for repeat INR check.  Assessment and plan of treatment:	Atrial fibrillation is the most common heart rhythm problem & has the risk of stroke & heart attack  It helps if you control your blood pressure, not drink more than 1-2 alcohol drinks per day, cut down on caffeine, getting treatment for over active thyroid gland, & getting exercise  Call your doctor if you feel your heart racing or beating unusually, chest tightness or pain, lightheaded, faint, shortness of breath especially with exercise  It is important to take your heart medication as prescribed  You may be on anticoagulation which is very important to take as directed - you may need blood work to monitor drug levels  Secondary Diagnosis:	Dementia  Assessment and plan of treatment:	Continue with current medication.  Secondary Diagnosis:	CHF (congestive heart failure)  Assessment and plan of treatment:	Weigh yourself daily.  If you gain 3lbs in 3 days, or 5lbs in a week call your Health Care Provider.  Do not eat or drink foods containing more than 2000mg of salt (sodium) in your diet every day.  Call your Health Care Provider if you have any swelling or increased swelling in your feet, ankles, and/or stomach.  Take all of your medication as directed.  If you become dizzy call your Health Care Provider.

## 2018-01-20 NOTE — DISCHARGE NOTE ADULT - MEDICATION SUMMARY - MEDICATIONS TO TAKE
I will START or STAY ON the medications listed below when I get home from the hospital:    amiodarone 200 mg oral tablet  -- 1 tab(s) by mouth once a day  -- Indication: For Antiarrhythmic    Coumadin  -- 2.5  by mouth once a day, being held for the past 4 days due to elevated INR  -- Indication: For Anti-coagulant    carvedilol 3.125 mg oral tablet  -- 1 tab(s) by mouth every 12 hours  -- Indication: For Blood Pressure    donepezil 10 mg oral tablet  -- 1 tab(s) by mouth once a day (at bedtime)  -- Indication: For Dementia    torsemide  -- 20  by mouth once a day  -- Indication: For Diuretic    folic acid 1 mg oral tablet  -- 1 tab(s) by mouth once a day  -- Indication: For Vitamin

## 2018-01-20 NOTE — DISCHARGE NOTE ADULT - CARE PROVIDER_API CALL
Patricio Garcia), Internal Medicine  [address] [id number]  Phone: [phone]  Fax: [phone]

## 2018-01-20 NOTE — PROGRESS NOTE ADULT - ATTENDING COMMENTS
Please page [phone] with questions or call the office [phone].
Dw pt's nephew last night and today.
Hold Digoxin  ReStart Torsemide.    Dw pt's son  DC home today.
Please page [phone] with questions or call the office [phone].
Please page [phone] with questions or call the office [phone].

## 2018-01-20 NOTE — PROGRESS NOTE ADULT - PROBLEM SELECTOR PROBLEM 1
Anemia, unspecified type
ESCALERA (dyspnea on exertion)
SHANTEL (acute kidney injury)

## 2018-01-20 NOTE — DISCHARGE NOTE ADULT - MEDICATION SUMMARY - MEDICATIONS TO STOP TAKING
I will STOP taking the medications listed below when I get home from the hospital:    candesartan  -- 4  by mouth once a day    Lasix  -- 20  by mouth once a day    digoxin  -- 0.125  by mouth once a day    metOLazone 2.5 mg oral tablet  -- 1 tab(s) by mouth once a day

## 2018-01-20 NOTE — DISCHARGE NOTE ADULT - HOSPITAL COURSE
89 F Hx MVR (tissue/porcine) x 2 (1992, 1998), chronic CHF (unclear systolic/diastolic), Afib on coumadin, mild dementia p/w progressive ESCALERA a/w suspected acute decompensated CHF, possibly symptomatic anemia and SHANTEL.  Problem/Plan - 1:  ·  Problem: Anemia, unspecified type.  Plan: diet as tolerated . s/p colonoscopy with apc    Problem/Plan - 2:  ·  Problem: ESCALERA (dyspnea on exertion).  Plan: Multifactorial;  Acute CHF vs anemia.  Torsemide started at discharge.         Problem/Plan - 3:  ·  Problem: Paroxysmal a-fib.  Plan: INR  Coumadin       Problem/Plan - 4:  ·  Problem: SHANTEL (acute kidney injury).  Plan:stable     Problem/Plan - 5:  ·  Problem: Dementia.  Plan: - will treat with Aricept.       Pt stable discharge home with outpatient follow up with PMD and Cardiologist.

## 2018-01-20 NOTE — DISCHARGE NOTE ADULT - PLAN OF CARE
Patient remains hemodynamically stable. s/p colonscopy.   Follow up with your PMD within 3 days of discharge. Follow up with your PMD for repeat INR check. Atrial fibrillation is the most common heart rhythm problem & has the risk of stroke & heart attack  It helps if you control your blood pressure, not drink more than 1-2 alcohol drinks per day, cut down on caffeine, getting treatment for over active thyroid gland, & getting exercise  Call your doctor if you feel your heart racing or beating unusually, chest tightness or pain, lightheaded, faint, shortness of breath especially with exercise  It is important to take your heart medication as prescribed  You may be on anticoagulation which is very important to take as directed - you may need blood work to monitor drug levels Continue with current medication. Weigh yourself daily.  If you gain 3lbs in 3 days, or 5lbs in a week call your Health Care Provider.  Do not eat or drink foods containing more than 2000mg of salt (sodium) in your diet every day.  Call your Health Care Provider if you have any swelling or increased swelling in your feet, ankles, and/or stomach.  Take all of your medication as directed.  If you become dizzy call your Health Care Provider.

## 2018-01-20 NOTE — PROGRESS NOTE ADULT - SUBJECTIVE AND OBJECTIVE BOX
PAGER:  [phone]               [facility] [address]              EMAIL [email or web address]   OFFICE [phone]                              *******CARDIOLOGY PROGRESS NOTE********                        INTERVAL HISTORY:  S/P Colonoscopy.  Doing well.  No CP or SOB>  wants to go home.        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  No sick contact, compliant with meds and diet. (10 Colt 2018 07:35)          Allergies    No Known Allergies    Intolerances    	    MEDICATIONS:  amiodarone    Tablet 200 milliGRAM(s) Oral daily  carvedilol 3.125 milliGRAM(s) Oral every 12 hours        donepezil 10 milliGRAM(s) Oral at bedtime        folic acid 1 milliGRAM(s) Oral daily      Home Medications:  amiodarone: 200  orally once a day (10 Colt 2018 09:28)  Aricept: 10  orally once a day (10 Colt 2018 09:28)  candesartan: 4  orally once a day (10 Colt 2018 09:28)  carvedilol: 3.125  orally 2 times a day (10 Colt 2018 09:28)  Coumadin: 2.5  orally once a day, being held for the past 4 days due to elevated INR (10 Colt 2018 09:29)  digoxin: 0.125  orally once a day (10 Colt 2018 09:28)  folic acid 1 mg oral tablet: 1 tab(s) orally once a day (10 Colt 2018 09:29)  metOLazone 2.5 mg oral tablet: 1 tab(s) orally once a day (10 Colt 2018 09:29)  torsemide: 20  orally once a day (10 Colt 2018 09:28)      PAST MEDICAL & SURGICAL HISTORY:  Paroxysmal a-fib  CHF (congestive heart failure)  No significant past surgical history      FAMILY HISTORY:  No pertinent family history in first degree relatives      SOCIAL HISTORY:  unchanged    REVIEW OF SYSTEMS:  CONSTITUTIONAL: No fever, weight loss, or fatigue  EYES: No eye pain, visual disturbances, or discharge  ENMT:  No difficulty hearing, tinnitus, vertigo; No sinus or throat pain  NECK: No pain or stiffness  RESPIRATORY: No cough, wheezing, chills or hemoptysis; No Shortness of Breath  CARDIOVASCULAR: No chest pain, palpitations, passing out, dizziness, or leg swelling  GASTROINTESTINAL: No abdominal or epigastric pain. No nausea, vomiting, or hematemesis; No diarrhea or constipation. No melena or hematochezia.  GENITOURINARY: No dysuria, frequency, hematuria, or incontinence  NEUROLOGICAL: No headaches, memory loss, loss of strength, numbness, or tremors  SKIN: No itching, burning, rashes, or lesions   LYMPH Nodes: No enlarged glands  ENDOCRINE: No heat or cold intolerance; No hair loss  MUSCULOSKELETAL: No joint pain or swelling; No muscle, back, or extremity pain  PSYCHIATRIC: No depression, anxiety, mood swings, or difficulty sleeping  HEME/LYMPH: No easy bruising, or bleeding gums  ALLERY AND IMMUNOLOGIC: No hives or eczema	    [x ] All others negative	  [ ] Unable to obtain    PHYSICAL EXAM:  T(C): 36.4 (01-20-18 @ 04:26), Max: 36.4 (01-19-18 @ 20:22)  HR: 69 (01-20-18 @ 04:26) (69 - 70)  BP: 117/67 (01-20-18 @ 04:26) (116/54 - 118/71)  RR: 18 (01-20-18 @ 04:26) (18 - 18)  SpO2: 97% (01-20-18 @ 04:26) (97% - 99%)  Wt(kg): --  I&O's Summary    19 Jan 2018 07:01  -  20 Jan 2018 07:00  --------------------------------------------------------  IN: 1515 mL / OUT: 550 mL / NET: 965 mL    20 Jan 2018 07:01  -  20 Jan 2018 10:51  --------------------------------------------------------  IN: 240 mL / OUT: 0 mL / NET: 240 mL        Appearance: Normal	  HEENT:   Normal oral mucosa, PERRL, EOMI	  Lymphatic: No lymphadenopathy  Cardiovascular: Normal S1 S2, No JVD, No murmurs, No edema  Respiratory: Lungs clear to auscultation	  Psychiatry: A & O x 3, Mood & affect appropriate  Gastrointestinal:  Soft, Non-tender, + BS	  Skin: No rashes, No ecchymoses, No cyanosis	  Neurologic: Non-focal  Extremities: Normal range of motion, No clubbing, cyanosis or edema  Vascular: Peripheral pulses palpable 2+ bilaterally      LABS:	 	    CBC Full  -  ( 20 Jan 2018 07:00 )  WBC Count : 4.24 K/uL  Hemoglobin : 7.5 g/dL  Hematocrit : 24.8 %  Platelet Count - Automated : 193 K/uL  Mean Cell Volume : 98.8 fl  Mean Cell Hemoglobin : 29.9 pg  Mean Cell Hemoglobin Concentration : 30.2 gm/dL  Auto Neutrophil # : x  Auto Lymphocyte # : x  Auto Monocyte # : x  Auto Eosinophil # : x  Auto Basophil # : x  Auto Neutrophil % : x  Auto Lymphocyte % : x  Auto Monocyte % : x  Auto Eosinophil % : x  Auto Basophil % : x    01-20    137  |  101  |  76<H>  ----------------------------<  114<H>  4.3   |  24  |  2.50<H>  01-19    138  |  101  |  69<H>  ----------------------------<  121<H>  4.3   |  23  |  2.33<H>    Ca    8.9      20 Jan 2018 07:12  Ca    8.9      19 Jan 2018 07:41

## 2018-01-20 NOTE — DISCHARGE NOTE ADULT - PATIENT PORTAL LINK FT
“You can access the Follow[employer] Patient Portal, offered by [facility], by registering with the following website: [email or web address]”

## 2018-01-20 NOTE — PROGRESS NOTE ADULT - PROBLEM SELECTOR PROBLEM 2
Digoxin toxicity
Paroxysmal a-fib

## 2018-01-20 NOTE — PROGRESS NOTE ADULT - PROBLEM SELECTOR PLAN 1
diet as tolerated   cbc daily  transfuse as needed  spoke with nephews, plan for colonoscopy on thursday
check hemolysis labs  diet as tolerated   cbc daily  transfuse as needed  family deferring work up
check hemolysis labs  diet as tolerated   cbc daily  transfuse as needed  possible colonoscopy next week if stable
diet as tolerated   cbc daily  transfuse as needed  may consider colonoscopy pending echo and further family dicussion
diet as tolerated   cbc daily  transfuse as needed  s/p colonoscopy with apc  diet as tolerated  anticoagulation as needed
diet as tolerated   cbc daily  transfuse as needed  s/p colonoscopy with apc  diet as tolerated  anticoagulation as needed
diet as tolerated   cbc daily  transfuse as needed  spoke with nephews, plan for colonoscopy on thursday
Multifactorial;  Acute CHF vs anemia      S/p IV lasix.
Multifactorial;  Acute CHF vs anemia  ECHO  S/p IV lasix
Multifactorial;  Acute CHF vs anemia  ECHO  S/p IV lasix.
Multifactorial;  Acute CHF vs anemia  IV Laisx    S/p IV lasix.
cr higher now- pre renal vs cardio renal  pts ef is poor  would re check ua and urine na/cr/cl/osm/k  check bladder scan to rule out obstruction  keep off nephrotoxins  trend bmp  want to avoid ivf given edema and poor ef  recheck cxr
cr overall stable  urine lytes reviewed  has edema but want to avoid lasix and ivf for now  encourage po intake  will monitor
cr slightly better- overall stable  urine lytes reviewed  has edema - want to avoid lasix and ivf for now  encourage po intake  will monitor
pre renal vs cardio renal  U lytes indicated intravascular depletion  continue to hold off on diuretics and ivf  advised pt to increase po intake  keep off ACE/ARB/NSAIDS and avoid iv contrast  trend bmp daily
pre renal vs cardio renal  U lytes indicating intravascular depletion  would hold off on diuretics and ivf  advised pt to increase po intake  keep off ACE/ARB/NSAIDS and avoid iv contrast  trend bmp daily
pre renal vs cardio renal  cr hopefully platueing  U lytes indicated intravascular depletion  continue to hold off on diuretics and ivf  advised pt to increase po intake  keep off ACE/ARB/NSAIDS and avoid iv contrast  trend bmp daily
pre renal vs cardio renal  cr overall stable  U lytes indicated intravascular depletion  continue to hold off on diuretics and ivf  advised pt to increase po intake  keep off ACE/ARB/NSAIDS and avoid iv contrast  trend bmp daily

## 2018-01-20 NOTE — PROGRESS NOTE ADULT - SUBJECTIVE AND OBJECTIVE BOX
no events overnight.    No Known Allergies    Hospital Medications:   MEDICATIONS  (STANDING):  amiodarone    Tablet 200 milliGRAM(s) Oral daily  carvedilol 3.125 milliGRAM(s) Oral every 12 hours  donepezil 10 milliGRAM(s) Oral at bedtime  folic acid 1 milliGRAM(s) Oral daily  warfarin 4 milliGRAM(s) Oral once      VITALS:  T(F): 97.7 (18 @ 12:50), Max: 97.7 (18 @ 12:50)  HR: 70 (18 @ 12:50)  BP: 132/72 (18 @ 12:50)  RR: 18 (18 @ 12:50)  SpO2: 96% (18 @ 12:50)  Wt(kg): --     @ 07:  -   @ 07:00  --------------------------------------------------------  IN: 1515 mL / OUT: 550 mL / NET: 965 mL     @ 07:  -   @ 13:41  --------------------------------------------------------  IN: 240 mL / OUT: 0 mL / NET: 240 mL        PHYSICAL EXAM:  Constitutional: NAD  HEENT: anicteric sclera, oropharynx clear.  Neck: No JVD  Respiratory: CTAB, no wheezes, rales or rhonchi  Cardiovascular: S1, S2, RRR  Gastrointestinal: BS+, soft, NT/ND  Extremities: No cyanosis or clubbing. No peripheral edema  Neurological: A/O x 3, no focal deficits  Psychiatric: Normal mood, normal affect  : No CVA tenderness. No monae.       LABS:      137  |  101  |  76<H>  ----------------------------<  114<H>  4.3   |  24  |  2.50<H>    Ca    8.9      2018 07:12      Creatinine Trend: 2.50 <--, 2.33 <--, 2.33 <--, 2.57 <--, 2.36 <--, 2.07 <--, 2.07 <--                        7.5    4.24  )-----------( 193      ( 2018 07:00 )             24.8     Urine Studies:  Urinalysis Basic - ( 2018 06:35 )    Color: Yellow / Appearance: Clear / S.021 / pH:   Gluc:  / Ketone: Negative  / Bili: Negative / Urobili: Negative mg/dL   Blood:  / Protein: Trace mg/dL / Nitrite: Negative   Leuk Esterase: Moderate / RBC: 0 /HPF / WBC 3 /HPF   Sq Epi:  / Non Sq Epi: 5 /HPF / Bacteria: Occasional      Creatinine, Random Urine: 90 mg/dL ( @ 07:56)  Protein/Creatinine Ratio Calculation: 0.2 Ratio ( 07:56)  Chloride, Random Urine: <20 mmol/L ( 07:56)  Potassium, Random Urine: 46 mmol/L ( @ 07:56)  Osmolality, Random Urine: 394 mos/kg ( @ 07:56)  Sodium, Random Urine: 20 mmol/L ( @ 07:56)    RADIOLOGY & ADDITIONAL STUDIES:

## 2018-01-20 NOTE — PROGRESS NOTE ADULT - ASSESSMENT
89 F Hx MVR (tissue/porcine) x 2 (1992, 1998), chronic CHF (unclear systolic/diastolic), Afib on coumadin, mild dementia p/w progressive ESCALERA a/w suspected acute decompensated CHF, possibly symptomatic anemia and SHANTEL.      Dw cardio and GI. Considering benefits vs risk of AC, Benefits outweights risks. Will start IV Heparin.    Anemia: Hemolytic:    S/p  colonoscopy .  S/p PRBC

## 2018-01-20 NOTE — PROGRESS NOTE ADULT - SUBJECTIVE AND OBJECTIVE BOX
Patient is a 89y old  Female who presents with a chief complaint of SOB (10 Colt 2018 07:35)      SUBJECTIVE / OVERNIGHT EVENTS:   Feels better.  Denies CP/SOB/Palpitation/HA.    MEDICATIONS  (STANDING):  amiodarone    Tablet 200 milliGRAM(s) Oral daily  carvedilol 3.125 milliGRAM(s) Oral every 12 hours  donepezil 10 milliGRAM(s) Oral at bedtime  folic acid 1 milliGRAM(s) Oral daily  warfarin 4 milliGRAM(s) Oral once    MEDICATIONS  (PRN):        CAPILLARY BLOOD GLUCOSE        I&O's Summary    19 Jan 2018 07:01  -  20 Jan 2018 07:00  --------------------------------------------------------  IN: 1515 mL / OUT: 550 mL / NET: 965 mL    20 Jan 2018 07:01  -  20 Jan 2018 16:28  --------------------------------------------------------  IN: 480 mL / OUT: 0 mL / NET: 480 mL        PHYSICAL EXAM:  GENERAL: NAD, well-developed  HEAD:  Atraumatic, Normocephalic  NECK: Supple, No JVD  CHEST/LUNG: Clear to auscultation bilaterally; No wheezing.  HEART: Regular rate and rhythm; No murmurs, rubs, or gallops  ABDOMEN: Soft, Nontender, Nondistended; Bowel sounds present  EXTREMITIES:   No clubbing, cyanosis, or edema  NEUROLOGY: AAO X 3  SKIN: No rashes    LABS:                        7.5    4.24  )-----------( 193      ( 20 Jan 2018 07:00 )             24.8     01-20    137  |  101  |  76<H>  ----------------------------<  114<H>  4.3   |  24  |  2.50<H>    Ca    8.9      20 Jan 2018 07:12      PT/INR - ( 20 Jan 2018 07:01 )   PT: 25.1 sec;   INR: 2.19 ratio         PTT - ( 19 Jan 2018 14:32 )  PTT:82.8 sec        CAPILLARY BLOOD GLUCOSE                    RADIOLOGY & ADDITIONAL TESTS:    Imaging Personally Reviewed:    Consultant(s) Notes Reviewed:      Care Discussed with Consultants/Other Providers:

## 2018-06-19 NOTE — PROVIDER CONTACT NOTE (CRITICAL VALUE NOTIFICATION) - PERSON GIVING RESULT:
Quality 431: Preventive Care And Screening: Unhealthy Alcohol Use - Screening: Patient screened for unhealthy alcohol use using a single question and scores less than 2 times per year [facility] Detail Level: Detailed Quality 128: Preventive Care And Screening: Body Mass Index (Bmi) Screening And Follow-Up Plan: BMI is documented within normal parameters and no follow-up plan is required. Quality 226: Preventive Care And Screening: Tobacco Use: Screening And Cessation Intervention: Patient screened for tobacco and never smoked Quality 130: Documentation Of Current Medications In The Medical Record: Current Medications Documented Quality 110: Preventive Care And Screening: Influenza Immunization: Influenza Immunization previously received during influenza season

## 2019-09-17 NOTE — ED PROVIDER NOTE - NS ED ATTENDING STATEMENT MOD
17-Sep-2019 09:13 I have personally seen and examined this patient.  I have fully participated in the care of this patient. I have reviewed all pertinent clinical information, including history, physical exam, plan and the Resident’s note and agree except as noted.

## 2019-11-13 NOTE — PROGRESS NOTE ADULT - PROBLEM SELECTOR PLAN 3
[facility] Emergency Department  [address] [place] [address]  Phone:  [phone]  Fax:  [phone]                                    Jen Corado   MRN: [id number]    Department:  [facility] Emergency Department   Date of Visit:  11/13/2019           After Visit Summary Signature Page    I have received my discharge instructions, and my questions have been answered. I have discussed any challenges I see with this plan with the nurse or doctor.    ..........................................................................................................................................  Patient/Patient Representative Signature      ..........................................................................................................................................  Patient Representative Print Name and Relationship to Patient    ..................................................               ................................................  Date                                   Time    ..........................................................................................................................................  Reviewed by Signature/Title    ...................................................              ..............................................  Date                                               Time          22EPIC Rev 08/18       
BMP  Nephro f/up noted.
possible pre renal  - will transfuse with diuresis  - will monitor u/o, and replace electrolytes  - will hold ARB and dig  Renal eval called.

## 2021-10-11 NOTE — ED ADULT NURSE NOTE - DURATION
day(s)/2 Cephalexin Pregnancy And Lactation Text: This medication is Pregnancy Category B and considered safe during pregnancy.  It is also excreted in breast milk but can be used safely for shorter doses.

## 2023-08-04 NOTE — PATIENT PROFILE ADULT. - MENTAL HEALTH CONDITIONS/SYMPTOMS, PROFILE
[facility]        NAME: Anna Shay is a 15 y.o. male  : 2008    MRN: [id number]  DATE: 2023  TIME: 1:49 PM    Assessment and Plan   Sports physical [Z02.5]  1. Sports physical              Patient Instructions       Follow up with PCP in 3-5 days. Proceed to  ER if symptoms worsen. Chief Complaint     Chief Complaint   Patient presents with   • Physical Exam     Sports physical         History of Present Illness       Patient presenting for sports physical.  He denies any significant past medical or surgical history. He does admit to a previous right lower extremity spiral fracture when he was 3years old after injuring his leg on a razor scooter. He denies any chest pain or shortness of breath while working out, but does state that he has a history of palpitations when he was 3years old. He states that this issue has since resollved. He denies daily use medication. Overall, he feels well and has no acute complaints. He states that he will be participating in golf in the fall, but has participated in multiple sports including basketball, lacrosse and wrestling. Review of Systems   Review of Systems   Constitutional: Negative for chills and fever. HENT: Negative for ear pain and sore throat. Eyes: Negative for pain and visual disturbance. Respiratory: Negative for cough and shortness of breath. Cardiovascular: Negative for chest pain and palpitations. Gastrointestinal: Negative for abdominal pain and vomiting. Genitourinary: Negative for dysuria and hematuria. Musculoskeletal: Negative for arthralgias and back pain. Skin: Negative for color change and rash. Neurological: Negative for seizures and syncope. All other systems reviewed and are negative. Current Medications     No current outpatient medications on file.     Current Allergies     Allergies as of 2023 - Reviewed 2023   Allergen Reaction Noted   • Pollen extract Nasal Congestion 04/22/2021            The following portions of the patient's history were reviewed and updated as appropriate: allergies, current medications, past family history, past medical history, past social history, past surgical history and problem list.     History reviewed. No pertinent past medical history. History reviewed. No pertinent surgical history. No family history on file. Medications have been verified. Objective   /71   Pulse 65   Resp 18   Ht 6' (1.829 m)   Wt 63.5 kg (140 lb)   SpO2 97%   BMI 18.99 kg/m²        Physical Exam     Physical Exam  Vitals and nursing note reviewed. Constitutional:       General: He is not in acute distress. Appearance: Normal appearance. He is normal weight. He is not ill-appearing, toxic-appearing or diaphoretic. HENT:      Head: Normocephalic and atraumatic. Right Ear: Tympanic membrane normal.      Left Ear: Tympanic membrane normal.      Nose: Nose normal. No congestion or rhinorrhea. Mouth/Throat:      Mouth: Mucous membranes are moist.      Pharynx: Oropharynx is clear. No oropharyngeal exudate or posterior oropharyngeal erythema. Eyes:      Extraocular Movements: Extraocular movements intact. Pupils: Pupils are equal, round, and reactive to light. Cardiovascular:      Rate and Rhythm: Normal rate and regular rhythm. Pulses: Normal pulses. Heart sounds: Normal heart sounds. No murmur heard. No friction rub. No gallop. Pulmonary:      Effort: Pulmonary effort is normal. No respiratory distress. Breath sounds: Normal breath sounds. No stridor. No wheezing, rhonchi or rales. Abdominal:      General: Bowel sounds are normal.      Palpations: Abdomen is soft. Tenderness: There is no abdominal tenderness. There is no guarding or rebound. Musculoskeletal:         General: No deformity. Normal range of motion. Cervical back: Normal range of motion and neck supple. No tenderness. Skin:     General: Skin is warm and dry. Capillary Refill: Capillary refill takes less than 2 seconds. Neurological:      General: No focal deficit present. Mental Status: He is alert and oriented to person, place, and time. Cranial Nerves: No cranial nerve deficit. Motor: No weakness.       Coordination: Coordination normal.      Gait: Gait normal.   Psychiatric:         Mood and Affect: Mood normal.         Behavior: Behavior normal. none

## 2024-01-25 NOTE — PROGRESS NOTE ADULT - RS GEN PE MLT RESP DETAILS PC
Primary assessment done by provider in lobby- RN discharge pt from lobby.  
respirations non-labored/diminished breath sounds, R/diminished breath sounds, L

## 2024-03-29 NOTE — PROGRESS NOTE ADULT - NEUROLOGICAL
noninvasive blood pressure monitor
Alert & oriented; no sensory, motor or coordination deficits, normal reflexes